# Patient Record
Sex: FEMALE | Race: ASIAN | NOT HISPANIC OR LATINO | Employment: FULL TIME | URBAN - METROPOLITAN AREA
[De-identification: names, ages, dates, MRNs, and addresses within clinical notes are randomized per-mention and may not be internally consistent; named-entity substitution may affect disease eponyms.]

---
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
## 2021-05-29 NOTE — PROGRESS NOTES
ASSESSMENT AND PLAN:  1. Screening examination for sexually transmitted disease    - Chlamydia trachomatis & Neisseria gonorrhoeae, Amplified Detection    2. Dermatitis    To develop the rash is present on her arms legs lower torso after swimming in the leg.  No one else was affected by the rash.  No evidence of involvement of mucosal surfaces.  Prednisone and hydroxyzine started for the rash her symptoms do not improve follow-up visit recommended            No orders of the defined types were placed in this encounter.    There are no discontinued medications.    No follow-ups on file.    CHIEF COMPLAINT:  Chief Complaint   Patient presents with     Rash     Check itchy rash on both legs,arms and abdomen since Friday (6/14/2019) after swimming       HISTORY OF PRESENT ILLNESS:  Gume is a 19 y.o. female who presents to the clinic today with her mother for rash. Gume is present with a Kimberli . This started four days ago right after [place]. It first started as spots on her ankle and wrist, and it then spread to her arms and legs. It is very itchy. There are some spots on her stomach and back, none in her groin area. The patient has been using an anti-itchy medication. A lot of other people went swimming in that same lake, but no one else got the same rash. She has not noticed any swollen glands in her neck or axillary areas.     REVIEW OF SYSTEMS:   Skin: Erythematous papular pruritic rash mostly on extremities.   Lymph nodes: No lymph node enlargement.  All other systems are negative.    PFSH:  Four days ago, she went swimming [place] a lake. She is accompanied by her mother today. Reviewed as below.     TOBACCO USE:  Social History     Tobacco Use   Smoking Status Never Smoker   Smokeless Tobacco Never Used       VITALS:  Vitals:    06/18/19 0858   BP: (!) 92/60   Patient Site: Left Arm   Patient Position: Sitting   Pulse: 79   Resp: 20   Temp: 98.1  F (36.7  C)   TempSrc: Oral   SpO2: 98%  "  Weight: 125 lb 4 oz (56.8 kg)   Height: 5' 1\" (1.549 m)     Wt Readings from Last 3 Encounters:   06/18/19 125 lb 4 oz (56.8 kg) (51 %, Z= 0.03)*   09/08/18 125 lb (56.7 kg) (54 %, Z= 0.11)*   03/14/18 125 lb (56.7 kg) (57 %, Z= 0.17)*     * Growth percentiles are based on [facility] (Girls, 2-20 Years) data.     Body mass index is 23.67 kg/m .      PHYSICAL EXAM:  General: Alert, cooperative, no distress, appears stated age  HEENT: Normocephalic, without obvious abnormality, atraumatic, moist mucous membranes  Eyes: PERRL, conjunctiva/cornea clear, EOM's intact  Lymph nodes: No cervical or axillary lymph node enlargement  Lungs: Clear to auscultation bilaterally, respirations unlabored  Heart: Regular rate and rhythm, S1 and S2 normal, no murmur, rub, or gallop  Neurologic:  A & O x 3.  No tremor, no focal findings.  Normal gait.   Skin: Non-blanching erythematous papular rash noted on the dorsal aspect of her hands, arms, and legs. A few papules on her abdomen. No petechiae. Rash not presents on palms, soles of feet, or fingernails.     DATA REVIEWED:  Additional History from Old Records Summarized (2): none  Decision to Obtain Records (1): none  Radiology Tests Summarized or Ordered (1): none  Labs Reviewed or Ordered (1): none  Medicine Test Summarized or Ordered (1): none  Independent Review of EKG or X-RAY(2 each): none      Francisca SALAZAR, am scribing for and in the presence of, Dr. Sanchez.    Dr. Daniel SALAZAR, personally performed the services described in this documentation, as scribed by Francisca Ayala in my presence, and it is both accurate and complete.      MEDICATIONS:  Current Outpatient Medications   Medication Sig Dispense Refill     famotidine (PEPCID) 40 MG tablet Take 1 tablet (40 mg total) by mouth at bedtime as needed for heartburn. 20 tablet 0     lidocaine-hydrocortisone-aloe 2.8-0.55 % Gel Apply twice a day 1 Tube 0     youwfvclh-anexazki-kpijq-w.pet 0.25-1 % Crea Apply thin layer to affected area twice " a day 1 Tube 0     No current facility-administered medications for this visit.      Total amount time spent in today's visit was 25 minutes greater than 50% of this time was spent discussing pathophysiology of dermatitis, and pharmacological actions of the medications today.  Total Data Points: 0    Please note that this clinical encounter uses voice recognition software, there may be typographical errors present

## 2021-05-29 NOTE — TELEPHONE ENCOUNTER
Pharmacist calling #[phone] stating that Hydroxyzine capsules are on back order so they are changing the script to tablets, please call with any questions.

## 2021-05-30 VITALS — BODY MASS INDEX: 25.89 KG/M2 | HEIGHT: 57 IN | WEIGHT: 120 LBS

## 2021-05-30 NOTE — TELEPHONE ENCOUNTER
New Appointment Needed  What is the reason for the visit:  pregnancy confirmation.    Pregnancy Confirmation Appt Needed  When was the first day of your last menstrual cycle?: patient said february is last time.   Have you done a home pregnancy test?: Yes: yes, positive .    Provider Preference: Any available  How soon do you need to be seen?: as soon as she can get in.   Waitlist offered?: No  Okay to leave a detailed message:  Yes

## 2021-05-31 VITALS — BODY MASS INDEX: 22.94 KG/M2 | WEIGHT: 121.5 LBS | HEIGHT: 61 IN

## 2021-05-31 VITALS — WEIGHT: 121.31 LBS | BODY MASS INDEX: 22.91 KG/M2 | HEIGHT: 61 IN

## 2021-05-31 NOTE — PROGRESS NOTES
CC: I was asked to see Gume King by Dr Blackburn secondary to a vulvar abscess.    HPI: The pt is a 19 y.o. SKarenF  who presents with a week history of a lump in the vulvar area.  She is seen with a professional Kimberli .  She is here with her mother.  It is increasing in size and becoming more painful.  She hasn't tried hot packs at home.     History reviewed. No pertinent past medical history.    History reviewed. No pertinent surgical history.    Patient's History reviewed. No pertinent family history.    Patient   Social History     Socioeconomic History     Marital status: Single     Spouse name: None     Number of children: None     Years of education: None     Highest education level: None   Occupational History     None   Social Needs     Financial resource strain: None     Food insecurity:     Worry: None     Inability: None     Transportation needs:     Medical: None     Non-medical: None   Tobacco Use     Smoking status: Never Smoker     Smokeless tobacco: Never Used   Substance and Sexual Activity     Alcohol use: Never     Frequency: Never     Drug use: Never     Sexual activity: Yes     Partners: Male   Lifestyle     Physical activity:     Days per week: None     Minutes per session: None     Stress: None   Relationships     Social connections:     Talks on phone: None     Gets together: None     Attends [employer] service: None     Active member of club or organization: None     Attends meetings of clubs or organizations: None     Relationship status: None     Intimate partner violence:     Fear of current or ex partner: None     Emotionally abused: None     Physically abused: None     Forced sexual activity: None   Other Topics Concern     None   Social History Narrative    9/8/18: Presents to the ED with mother and father.        Outpatient Medications Prior to Visit   Medication Sig Dispense Refill     acetaminophen (TYLENOL) 325 MG tablet Take 1-2 tablets (325-650 mg total) by mouth  every 4 (four) hours as needed.  0     docusate sodium (COLACE) 100 MG capsule Take 1 capsule (100 mg total) by mouth daily. (Patient not taking: Reported on 8/21/2019      )  0     ibuprofen (ADVIL,MOTRIN) 800 MG tablet Take 1 tablet (800 mg total) by mouth every 8 (eight) hours. (Patient not taking: Reported on 8/21/2019      )  0     No facility-administered medications prior to visit.        Patient has No Known Allergies.    ROS:  12 part ROS is negative aside from those symptoms in the HPI    PE:  /67 (Patient Site: Right Arm, Patient Position: Sitting, Cuff Size: Adult Regular)   Pulse 98   Ht 5' (1.524 m)   Wt 132 lb (59.9 kg)   LMP 06/07/2019           Body mass index is 25.78 kg/m .    General: overweight KarenF, NAD  EG/BUS: 0.5 x 1 cm cystic structure upper left labia majora; infiltrated with 1% lidocaine after cleaning with Betadine.  I&D with 11 blade scalpel.  Patient tolerated well.  Psych: normal mood  Neuro: CN I-XII grossly intact  MS: normal gait    Assessment: 19 y.o. SKare  with symptomatic Bartholin's    Plan: Natural history of vulvar cysts discussed with the patient.  I recommended she hot pack the area 2-3 times a day for the next couple days to keep it drained.  I counseled her that these can return.  If she gets a new one, she should try hot packing right away.  Questions answered.

## 2021-05-31 NOTE — PROGRESS NOTES
ASSESSMENT/PLAN:    Problem List Items Addressed This Visit     None      Visit Diagnoses     Abscess of left genital labia    -  Primary    Will have her get into OB/GYN today for potential draining of the abscess.    Relevant Orders    Ambulatory referral to Obstetrics / Gynecology    Dysuria        UTI ruled out with normal UA today    Relevant Orders    Urinalysis-UC if Indicated (Completed)           Return in about 1 month (around 9/21/2019) for Wellness Visit/Healthcare Maintainance Visit, with your primary care doctor.     SUBJECTIVE:  Gume King is a 19 y.o. female here for Vaginal bump and dysuria for about 1 week.  Worsening.    Of note, she had a stillbirth last month after presenting with spontaneous rupture of membranes at 22 weeks.  Lochia still present.      ROS:  Remainder review of systems is negative unless previously stated    PHQ-2 Total Score: 0 (6/18/2019  8:58 AM)       The following portions of the patient's history were reviewed and updated as appropriate: allergies, current medications and problem list  Current Outpatient Medications on File Prior to Visit   Medication Sig     acetaminophen (TYLENOL) 325 MG tablet Take 1-2 tablets (325-650 mg total) by mouth every 4 (four) hours as needed.     docusate sodium (COLACE) 100 MG capsule Take 1 capsule (100 mg total) by mouth daily. (Patient not taking: Reported on 8/21/2019      )     ibuprofen (ADVIL,MOTRIN) 800 MG tablet Take 1 tablet (800 mg total) by mouth every 8 (eight) hours. (Patient not taking: Reported on 8/21/2019      )     No current facility-administered medications on file prior to visit.         Social History     Tobacco Use   Smoking Status Never Smoker   Smokeless Tobacco Never Used       OBJECTIVE:  :  /72   Pulse 79   Temp 98.4  F (36.9  C) (Oral)   Resp 20   Ht 5' (1.524 m)   Wt 135 lb 8 oz (61.5 kg)   LMP 06/07/2019   SpO2 98%   BMI 26.46 kg/m    Wt Readings from Last 3 Encounters:   08/21/19 135 lb 8 oz  (61.5 kg) (68 %, Z= 0.47)*   07/09/19 130 lb (59 kg) (60 %, Z= 0.25)*   06/18/19 125 lb 4 oz (56.8 kg) (51 %, Z= 0.03)*     * Growth percentiles are based on [facility] (Girls, 2-20 Years) data.         Gen:  A&A, NAD  : Left labia majora is quite swollen.  There is no overlying erythema or induration.  The area is very tender.  There is a firm oblong mass that is probably 1/2 cm long by half centimeter wide.    Results for orders placed or performed in visit on 08/21/19   Urinalysis-UC if Indicated   Result Value Ref Range    Color, UA Yellow Colorless, Yellow, Straw, Light Yellow    Clarity, UA Slightly Cloudy (!) Clear    Glucose, UA Negative Negative    Bilirubin, UA Negative Negative    Ketones, UA Negative Negative    Specific Gravity, UA 1.020 1.005 - 1.030    Blood, UA Moderate (!) Negative    pH, UA 7.0 5.0 - 8.0    Protein, UA Negative Negative mg/dL    Urobilinogen, UA 0.2 E.U./dL 0.2 E.U./dL, 1.0 E.U./dL    Nitrite, UA Negative Negative    Leukocytes, UA Negative Negative

## 2021-05-31 NOTE — TELEPHONE ENCOUNTER
Vaginal pain / swelling x 1 week      Yes swollen labia area + pain with urination       Did miscarry last month - no follow up from from that   No fever   No belly pain   No vaginal bleeding     No blood in urine    No back pain    No fever          A/P:   > Appt for tomorrow for appt     > encourage fluids and ice pack prn pain / swelling prn      Juan Rebollar, RN   Triage and Medication Refills

## 2021-06-01 VITALS — BODY MASS INDEX: 23.6 KG/M2 | WEIGHT: 125 LBS | HEIGHT: 61 IN

## 2021-06-03 VITALS — BODY MASS INDEX: 23.65 KG/M2 | HEIGHT: 61 IN | WEIGHT: 125.25 LBS

## 2021-06-03 VITALS — BODY MASS INDEX: 25.91 KG/M2 | HEIGHT: 60 IN | WEIGHT: 132 LBS

## 2021-06-03 VITALS — BODY MASS INDEX: 26.6 KG/M2 | WEIGHT: 135.5 LBS | HEIGHT: 60 IN

## 2021-06-04 NOTE — TELEPHONE ENCOUNTER
report indicate that the patient needs WCC 18 yrs, HPV#3, tdap and flu vaccine   writer intends to contact the patient to assist in scheduling appointment. Called patient and agree to schedule appointment for January 31, 2020

## 2021-06-09 NOTE — PROGRESS NOTES
"ASSESMENT AND PLAN:  Diagnoses and all orders for this visit:    Constipation  -     docusate sodium (COLACE) 100 MG capsule; Take 1 capsule (100 mg total) by mouth 2 (two) times a day for 10 days.  Dispense: 10 capsule; Refill: 0    Hemorrhoid  -     phenylephrine-shark liver oil-mineral oil-petrolatum (PREPARATION H) rectal ointment;     Immunization due  -     HPV vaccine 9 valent 3 dose IM    Advised to increase fluids and fiber intake to avoid constipation.  We'll try Preparation H.  Advised to call if experience constant pain or increasing hemorrhage size and constant bleeding.          SUBJECTIVE: Gume King is here with rectal bleeding and pain.  She noticed a small bump also.  The bleeding is usually wipe type but sometimes mixed with the stool, fresh blood.  No constant rectal pain.  She tends to get constipated.  Mom states that she does not like to eat vegetables and fruits.  No recurrent abdominal pain.  No diarrhea in between constipations.    No past medical history on file.  Patient Active Problem List   Diagnosis     Constipation     Dermatitis     Abdominal Pain       Allergies:  No Known Allergies    History   Smoking Status     Never Smoker   Smokeless Tobacco     Never Used       Review of systems otherwise negative except as listed in HPI.   History   Smoking Status     Never Smoker   Smokeless Tobacco     Never Used       OBJECTICE: /66 (Patient Site: Left Arm, Patient Position: Sitting, Cuff Size: Adult Regular)  Pulse 86  Temp 98.2  F (36.8  C) (Oral)   Resp 16  Ht 4' 8.5\" (1.435 m)  Wt 120 lb (54.4 kg)  LMP  (LMP Unknown) Comment: irregular menses  BMI 26.43 kg/m2          GEN-alert,  in no apparent distress.  HEENT-mucous membranes are moist, neck is supple.  CV-regular rate and rhythm with no murmur.   RESP-lungs clear to auscultation .  RECTAL- small external hemorrhoid at the 10 o'clock position, no active bleeding, normal color.         Chong Larios   4/7/2017     "

## 2021-06-14 NOTE — PROGRESS NOTES
"ASSESMENT AND PLAN:  Diagnoses and all orders for this visit:    Hemorrhoids, unspecified hemorrhoid type  -     Ambulatory referral to Colorectal Surgery  Referral due to constant pain and bleeding.  We will try lidocaine hydrocortisone topical for now.    Immunization due  -     Influenza, Seasonal Quad, Preservative Free 36+ Months  -     Meningococcal MCV4P  -     HPV vaccine 9 valent 3 dose IM    Other orders  -     lidocaine-hydrocortisone-aloe 2.8-0.55 % Gel; Apply twice a day  Dispense: 1 Tube; Refill: 0        SUBJECTIVE: Gume King is here with rectal pain.  She was seen about 8 months ago with the same problem and was given hemorrhoid cream.  States the cream did not help.  She is having bleeding with every bowel movement but denied constipation.  Pain is  getting worse for the past few months.  She has trouble sitting through classes at school due to pain.  Denied pruritus.  She eats lots of vegetables and fruits and drink plenty of water.    No past medical history on file.  Patient Active Problem List   Diagnosis     Constipation     Dermatitis     Abdominal Pain       Allergies:  No Known Allergies    History   Smoking Status     Never Smoker   Smokeless Tobacco     Never Used       Review of systems otherwise negative except as listed in HPI.   History   Smoking Status     Never Smoker   Smokeless Tobacco     Never Used       OBJECTICE: BP 96/74 (Patient Site: Left Arm, Patient Position: Sitting, Cuff Size: Adult Regular)  Pulse 84  Temp 98.3  F (36.8  C) (Oral)   Resp 16  Ht 5' 0.63\" (1.54 m)  Wt 121 lb 8 oz (55.1 kg)  LMP 11/24/2017 (Exact Date)  BMI 23.24 kg/m2          GEN-alert,  in no apparent distress.  HEENT-mucous membranes are moist, neck is supple.  RECTAL- Small external hemorrhoids at 3 and 6 o'clock position. No bleeding on examining finger.  Exam was not completed due to pain      Chong Za   12/1/2017   This transcription uses voice recognition software, which may contain " typographical errors.

## 2021-06-15 NOTE — PROGRESS NOTES
[facility] Well Child Check    ASSESSMENT & PLAN  Gume King is a 17  y.o. 9  m.o. who has normal growth and normal development.    1. WCC (well child check)    2. Constipation  Increase fiber , fluids.     3. Pain with bowel movements  Referred to GI.  Upper GI and Colonoscopy done, no f/u appt scheduled yet.    4. Skin tag of perianal region  As above. Saw GI.    Return to clinic in 1 year for a Well Child Check or sooner as needed    IMMUNIZATIONS/LABS  No immunizations due today.    REFERRALS  Dental:  The patient has already established care with a dentist.  Other:  No additional referrals were made at this time.    ANTICIPATORY GUIDANCE  I have reviewed age appropriate anticipatory guidance.  Social:  Peer Pressure  Play and Communication:  Appropriate Use of TV and limit screentime  Health:  Dental Care  Sexuality:  Body Changes and Safe Sex    HEALTH HISTORY  Do you have any concerns that you'd like to discuss today?: No concerns       Accompanied by Mother    Refills needed? No    Do you have any forms that need to be filled out? Yes excuse note for school    services provided by: Agency     /Agency Name [employer] [facility]   Location of  Services: In person        Do you have any significant health concerns in your family history?: No  No family history on file.  Since your last visit, have there been any major changes in your family, such as a move, job change, separation, divorce, or death in the family?: No  Has a lack of transportation kept you from medical appointments?: No: pt has own transportation    Home  Who lives in your home?:  Mom, siblings and grandma  Social History     Social History Narrative     Do you have any concerns about losing your housing?: No  Is your housing safe and comfortable?: Yes  Do you have any trouble with sleep?:  No    Education  What school do you child attend?:  [employer]  What grade are you in?:   11th  How do you perform in school (grades, behavior, attention, homework?: Doing good, mother has no concerned.     Eating  Do you eat regular meals including fruits and vegetables?:  no  What are you drinking (cow's milk, water, soda, juice, sports drinks, energy drinks, etc)?: juice, water and milk  Have you been worried that you don't have enough food?: No  Do you have concerns about your body or appearance?:  No    Activities  Do you have friends?:  yes  Do you get at least one hour of physical activity per day?:  no  How many hours a day are you in front of a screen other than for schoolwork (computer, TV, phone)?:  4-5 hours  What do you do for exercise?: No  Do you have interest/participate in community activities/volunteers/school sports?:  no    MENTAL HEALTH SCREENING  PHQ-2 Total Score: 0 (1/4/2018 12:22 PM)  PHQ-9 Total Score: 0 (1/4/2018 12:22 PM)    VISION/HEARING  Vision: Completed. See Results  Hearing:  Completed. See Results     Hearing Screening    125Hz 250Hz 500Hz 1000Hz 2000Hz 3000Hz 4000Hz 6000Hz 8000Hz   Right ear:   Pass Pass Pass  Pass     Left ear:   Pass Pass Pass  Pass     Comments: 40 dBHL     Visual Acuity Screening    Right eye Left eye Both eyes   Without correction: 10/8 10/8 10/8   With correction:          TB Risk Assessment:  The patient and/or parent/guardian answer positive to:  No    Dyslipidemia Risk Screening  Have either of your parents or any of your grandparents had a stroke or heart attack before age 55?: No  Any parents with high cholesterol or currently taking medications to treat?: Yes: mother     Dental  When was the last time you saw the dentist?: Seen this past December 2017.   Is child seen by dentist?     Yes    Patient Active Problem List   Diagnosis     Constipation     Dermatitis     Abdominal Pain       Drugs  Does the patient use tobacco/alcohol/drugs?:  no    Safety  Does the patient have any safety concerns (peer or home)?:  no  Does the patient use  "safety belts, helmets and other safety equipment?:  yes    Sex  Have you ever had sex?:  No    MEASUREMENTS  Height:  5' 1\" (1.549 m)  Weight: 121 lb 5 oz (55 kg)  BMI: Body mass index is 22.92 kg/(m^2).  Blood Pressure: 98/74  Blood pressure percentiles are 14 % systolic and 79 % diastolic based on NHBPEP's 4th Report. Blood pressure percentile targets: 90: 123/79, 95: 127/83, 99 + 5 mmH/96.    PHYSICAL EXAM  Head - Normal.  Eyes-symmetric corneal pinpoint reflex, symmetric red reflex.  Extraocular movement intact.  ENT-tympanic membranes are clear bilaterally.  Oropharynx is clear.  Neck-supple, no palpable mass or lymphadenopathy.  CV-regular rate and rhythm with no murmur.  Respiratory-lungs clear to auscultation.  Abdomen-soft, nontender, no palpable masses or organomegaly.  Genitourinary- Deferrd, no concerns.  Extremities-warm with no edema.  Neurologic-cranial nerves II through XII are intact, strength and sensation are symmetric.  Skin-no atypical appearing lesions, no rash.    "

## 2021-06-16 PROBLEM — K64.4 SKIN TAG OF PERIANAL REGION: Status: ACTIVE | Noted: 2018-01-04

## 2021-06-16 PROBLEM — R19.8 PAIN WITH BOWEL MOVEMENTS: Status: ACTIVE | Noted: 2018-01-04

## 2021-06-16 NOTE — PROGRESS NOTES
"ASSESMENT AND PLAN:  Diagnoses and all orders for this visit:    Screening examination for pulmonary tuberculosis  -     QTF-Mycobacterium tuberculosis by [facility]-TB Gold  Will mail result if negative.  CXR if indicated.    Pain with bowel movements  Advised to keep f/u appt with GI.    Skin tag of perianal region        SUBJECTIVE: Gume King is here for screening TB test.   Has been working as PCA. Last TB test was 7 years ago, normal per mom. No record in chart.   No known active TB contact. No weight loss, night sweat or chronic cough.    Seen by GI for anal skin tag, work up done by GI, EGD, colonoscopy done. F/U appt scheduled next week.        No past medical history on file.  Patient Active Problem List   Diagnosis     Constipation     Dermatitis     Abdominal Pain     Pain with bowel movements     Skin tag of perianal region       Allergies:  No Known Allergies    History   Smoking Status     Never Smoker   Smokeless Tobacco     Never Used       Review of systems otherwise negative except as listed in HPI.   History   Smoking Status     Never Smoker   Smokeless Tobacco     Never Used       OBJECTICE: BP (!) 88/58 (Patient Site: Left Arm, Patient Position: Sitting, Cuff Size: Adult Regular)  Pulse 68  Temp 98  F (36.7  C) (Oral)   Resp 16  Ht 5' 1\" (1.549 m)  Wt 125 lb (56.7 kg)  LMP 03/05/2018 (Exact Date)  BMI 23.62 kg/m2          GEN-alert,  in no apparent distress.  HEENT-mucous membranes are moist, neck is supple.  CV-regular rate and rhythm with no murmur.   RESP-lungs clear to auscultation .  SKIN-normal        [facility]   3/14/2018   "

## 2021-06-19 NOTE — LETTER
Letter by Ave Stephenson MD at      Author: Ave Stephenson MD Service: -- Author Type: --    Filed:  Encounter Date: 8/22/2019 Status: (Other)         August 22, 2019     Patient: Gume King   YOB: 2000   Date of Visit: 8/22/2019       To Whom it May Concern:    Gume King was seen in my clinic on 8/22/2019.     If you have any questions or concerns, please don't hesitate to call.    Sincerely,         Electronically signed by Ave Stephenson MD

## 2021-06-19 NOTE — LETTER
Letter by Ree Sanford MD at      Author: Ree Sanford MD Service: -- Author Type: --    Filed:  Encounter Date: 7/9/2019 Status: (Other)         July 9, 2019     Patient: Gume King   YOB: 2000   Date of Visit: 7/9/2019       To Whom It May Concern:    Gume King was hospitalized at [facility] on 07/09/2019.  It is my medical opinion that Gume King should not return to work for 2 weeks.  She will be seen in clinic within 2 weeks to assess if she is able to return to work at that time..    If you have any questions or concerns, please don't hesitate to call.    Sincerely,        Electronically signed by Ree Sanford MD

## 2021-07-14 PROBLEM — Z34.90 PREGNANT: Status: RESOLVED | Noted: 2019-07-09 | Resolved: 2019-07-09

## 2022-04-19 ENCOUNTER — PATIENT OUTREACH (OUTPATIENT)
Dept: CARE COORDINATION | Facility: CLINIC | Age: 22
End: 2022-04-19

## 2022-04-19 NOTE — PROGRESS NOTES
Clinic Care Coordination Contact  [facility]/Voicemail    Clinical Data: Care Coordinator Outreach  Outreach attempted x 1. Unable to leave message on patient's voicemail with call back information and request return call. Cell phone voicemail is full.       Home phone [phone], belongs to someone else.     Plan: Care Coordinator will try to reach patient again in 1-2 business days.    Yolanda Cantrell  [facility] Care Coordination  [facility]    Phone: [phone]

## 2022-04-22 ENCOUNTER — PATIENT OUTREACH (OUTPATIENT)
Dept: CARE COORDINATION | Facility: CLINIC | Age: 22
End: 2022-04-22

## 2022-04-22 NOTE — LETTER
[facility] CARE COORDINATION  [address]  [place] [address]    April 22, 2022    Gume Knig  [address] [place] [address]      Dear Gume,    I am a clinic community health worker who works with Chong Larios MD at [facility]. I have been trying to reach you recently to introduce Clinic Care Coordination and to see if there was anything I could assist you with.  Below is a description of clinic care coordination and how I can further assist you.      The clinic care coordination team is made up of a registered nurse,  and community health worker who understand the health care system. The goal of clinic care coordination is to help you manage your health and improve access to the health care system in the most efficient manner. The team can assist you in meeting your health care goals by providing education, coordinating services, strengthening the communication among your providers and supporting you with any resource needs.    Please feel free to contact me at [phone] with any questions or concerns. We are focused on providing you with the highest-quality healthcare experience possible and that all starts with you.     Sincerely,     Yolanda Cantrell  Clinic Care Coordination  [facility]    Phone: [phone]

## 2022-04-22 NOTE — PROGRESS NOTES
Clinic Care Coordination Contact  [facility]/Voicemail    Clinical Data: Care Coordinator Outreach  Outreach attempted x 2. Unable to leave message on patient's voicemail with call back information and request return call. Cell phone voicemail is full.       Home phone [phone], belongs to someone else.     Plan: Care Coordinator will send care coordination introduction letter with care coordinator contact information and explanation of care coordination services via mail. Care Coordinator will do no further outreaches at this time.    Yolanda Cantrell  [facility] Care Coordination  [facility]    Phone: [phone]

## 2022-05-17 NOTE — PROGRESS NOTES
"Millbrook was seen today for contraception.    Diagnoses and all orders for this visit:    Nexplanon removal    Removed without difficulty, dressing placed.  Pt instructed to remove tomorrow and treat as normal ski.  RTC one year for routine preventive visit, sooner if she would like contraception. I did discuss with her that now that the Nexplanon is out, she has no protection against pregnancy.      Recommended chlamydia screening- she declined.  She is also due for a Pap, has never had one .  Rec. That she see her PCP for that, as she does not want to do it today.     Professional phone  used with this visit.    S:  Has had her Nexplanon in for almost three years and has had bleeding every day of those three years.  Sometimes light, sometimes heavy. Wants it out today, not interested in any other means of preventing pregnancy.     ROS neg  Past Medical, social, family histories, medications, and allergies reviewed and updated       O:  /60   Pulse 62   Resp 16   Ht 1.545 m (5' 0.83\")   Wt 68.4 kg (150 lb 12 oz)   SpO2 98%   BMI 28.64 kg/m      Patient is in no apparent physical distress.  Vitals are as recorded. She is masked.  Head and face are normal.  Conjunctiva are clear.  Extremities are without edema.  Gait is normal.  Skin is without rashes.  Mood and affect are appropriate.      Procedure Note:    Risks and benefits explained to patient, consent signed.  Area of  insertion scar prepped in sterile fashion.  3 cc 1% xylocaine used for local anesthesia. Skin punctured with 15 blade scalpel. Implant removed without  difficulty.  Pt tolerated procedure well. EBL 0, complications none.  Pressure dressing placed.            "

## 2022-05-23 ENCOUNTER — OFFICE VISIT (OUTPATIENT)
Dept: FAMILY MEDICINE | Facility: CLINIC | Age: 22
End: 2022-05-23

## 2022-05-23 VITALS
OXYGEN SATURATION: 98 % | HEART RATE: 62 BPM | RESPIRATION RATE: 16 BRPM | WEIGHT: 150.75 LBS | HEIGHT: 61 IN | SYSTOLIC BLOOD PRESSURE: 108 MMHG | BODY MASS INDEX: 28.46 KG/M2 | DIASTOLIC BLOOD PRESSURE: 60 MMHG

## 2022-05-23 DIAGNOSIS — Z30.46 NEXPLANON REMOVAL: Primary | ICD-10-CM

## 2022-05-23 PROCEDURE — 11982 REMOVE DRUG IMPLANT DEVICE: CPT | Mod: 25 | Performed by: FAMILY MEDICINE

## 2023-01-29 ENCOUNTER — HOSPITAL ENCOUNTER (EMERGENCY)
Facility: HOSPITAL | Age: 23
Discharge: HOME | End: 2023-01-29
Attending: EMERGENCY MEDICINE | Admitting: EMERGENCY MEDICINE
Payer: COMMERCIAL

## 2023-01-29 VITALS
SYSTOLIC BLOOD PRESSURE: 102 MMHG | OXYGEN SATURATION: 95 % | HEART RATE: 87 BPM | BODY MASS INDEX: 29.45 KG/M2 | WEIGHT: 150 LBS | HEIGHT: 60 IN | RESPIRATION RATE: 18 BRPM | DIASTOLIC BLOOD PRESSURE: 52 MMHG | TEMPERATURE: 98.4 F

## 2023-01-29 DIAGNOSIS — N75.0 BARTHOLIN CYST: ICD-10-CM

## 2023-01-29 PROCEDURE — 250N000009 HC RX 250: Performed by: EMERGENCY MEDICINE

## 2023-01-29 PROCEDURE — 250N000011 HC RX IP 250 OP 636: Performed by: EMERGENCY MEDICINE

## 2023-01-29 PROCEDURE — 56420 I&D BARTHOLINS GLAND ABSCESS: CPT

## 2023-01-29 PROCEDURE — 99283 EMERGENCY DEPT VISIT LOW MDM: CPT | Mod: 25

## 2023-01-29 RX ORDER — HYDROCODONE BITARTRATE AND ACETAMINOPHEN 5; 325 MG/1; MG/1
1 TABLET ORAL EVERY 6 HOURS PRN
Qty: 10 TABLET | Refills: 0 | Status: SHIPPED | OUTPATIENT
Start: 2023-01-29 | End: 2023-02-01

## 2023-01-29 RX ADMIN — EPINEPHRINE BITARTRATE 3 ML: 1 POWDER at 17:21

## 2023-01-29 ASSESSMENT — ACTIVITIES OF DAILY LIVING (ADL)
ADLS_ACUITY_SCORE: 35
ADLS_ACUITY_SCORE: 33

## 2023-01-29 NOTE — ED TRIAGE NOTES
Reports having Bartholin cyst first noted two months ago. Very painful, can't sit down. No fevers/chills. Took tylenol two hours ago.

## 2023-01-29 NOTE — ED PROVIDER NOTES
EMERGENCY DEPARTMENT ENCOUNTER            IMPRESSION:  Bartholin cyst      MEDICAL DECISION MAKING:  Patient evaluated for vaginal pain and swelling.  She has a history of Bartholin cyst.    On exam there is a large left Bartholin cyst.    The cyst was incised and drained without complication.    Patient discharged home with pain medication and outpatient GYN follow-up      =================================================================  CHIEF COMPLAINT:  Chief Complaint   Patient presents with     Cyst         HPI  Gume King is a 22 year old female with a history of skin tag of perianal region, pain with bowel movements, and dermatitis, who presents to the ED by walk in accompanied by boyfriend for evaluation of abscess.     For the past 2 months, the patient reports having an abscess to her rectum. She states that she initially did not have pain with the abscess. However, last Thursday (~2 weeks ago), the patient reports that she developed worsening pain to the abscess, prompting her to be present in the ED. She denies fevers and chills. She was able to take Tylenol ~2 hours ago. The patient also notes that she had an abscess to her rectum ~2 years ago. The patient states that they were able to drain the abscess and she felt improved. The patient otherwise denies any other symptoms or complaints at this time.    I, Wilner Aguilar am serving as a scribe to document services personally performed by Dr. Philip Reynolds MD, based on my observation and the provider's statements to me. I, Dr. Philip Reynolds MD attest that Wilner Aguilar is acting in a scribe capacity, has observed my performance of the services and has documented them in accordance with my direction.      REVIEW OF SYSTEMS   Constitutional: Denies fever, chills, unintentional weight loss or fatigue   Eyes: Denies visual changes or discharge    HENT: Denies sore throat, ear pain or neck pain  Respiratory: Denies cough or shortness of breath    Cardiovascular: Denies  chest pain, palpitations or leg swelling  GI: Denies abdominal pain, nausea, vomiting, or dark, bloody stools.    : Denies hematuria, dysuria, or flank pain  Musculoskeletal: Denies any new back pain or new muscle/joint pains  Skin: Positive for abscess. Denies rash or wound  Neurologic: Denies current headache, new weakness, focal weakness, or sensory changes    Lymphatic: Denies swollen glands    Psychiatric: Denies depression, suicidal ideation or homicidal ideation.    Remainder of systems reviewed, unless noted in HPI all others negative.      PAST MEDICAL HISTORY:  History reviewed. No pertinent past medical history.    PAST SURGICAL HISTORY:  History reviewed. No pertinent surgical history.      CURRENT MEDICATIONS:    HYDROcodone-acetaminophen (NORCO) 5-325 MG tablet        ALLERGIES:  No Known Allergies    FAMILY HISTORY:  History reviewed. No pertinent family history.    SOCIAL HISTORY:   Social History     Socioeconomic History     Marital status: Single     Spouse name: None     Number of children: None     Years of education: None     Highest education level: None   Tobacco Use     Smoking status: Never     Smokeless tobacco: Never   Substance and Sexual Activity     Alcohol use: Never     Drug use: Never     Sexual activity: Yes     Partners: Male   Social History Narrative    9/8/18: Presents to the ED with mother and father.        PHYSICAL EXAM:    /52   Pulse 87   Temp 98.4  F (36.9  C) (Oral)   Resp 18   Ht 1.524 m (5')   Wt 68 kg (150 lb)   LMP 01/14/2023 (Exact Date)   SpO2 95%   BMI 29.29 kg/m      Constitutional: Awake, alert, in no acute distress  Respiratory: Respirations even, unlabored.  Cardiovascular: Regular   GI: Large 3 cm left labial Bartholin's gland cyst.  Nurse chaperone  Back: No CVA tenderness.    Psychiatric: Normal mood and affect. Normal judgement.    ED COURSE:    5:02 PM I met with the patient, obtained history, performed an initial exam, and discussed  options and plan for diagnostics and treatment here in the ED. PPE worn: surgical mask, gloves   7:39 PM I performed an s/p incision and drainage.  8:20 PM I rechecked and updated the patient. Patient is comfortable and agreeable with plan to discharge.      Medical Decision Making    History:    Supplemental history from: Family    External Record(s) reviewed: External medical records care everywhere    Work Up:    Chart documentation includes differential considered and any EKGs or imaging independently interpreted by provider.  Laboratory, EKG, radiology independently reviewed    In additional to work up documented, I considered the following work up:    External consultation:    Discussion of management with another provider: None     Complicating factors:    Care impacted by chronic illness: None    Care affected by social determinants of health: Access to care    Disposition considerations: Discharge            I have independently reviewed and interpreted the EKG(s) documented above.      PROCEDURES:   PROCEDURE: Incision and Drainage   INDICATIONS: Localized abscess   PROCEDURE PROVIDER: Dr Philip Reynolds   SITE:  Left labia   MEDICATION:  10 mLs of 1% Lidocaine with epinephrine   NOTE: The area was prepped with chlorhexidine and draped off in the usual sterile fashion.  Local anesthetic was injected subcutaneously with anesthesia effects demonstrated prior to proceeding.  The area of maximal fluctuance was opened with a # 11 Blade ([place]) using a Single Straight incision to allow for drainage.  The abscess was drained.  The abscess cavity was bluntly explored to separate any loculations. No Packing was placed into the abscess cavity.  A sterile dressing was placed over the area.   COMPLEXITY: Complex  Special attention to avoid the urethra.  Simple = single, furuncle, paronychia, superficial  Complex = multiple or abscess requiring probing, loculations, packing placement   COMPLICATIONS: Patient tolerated  procedure well, without complication           MEDICATIONS GIVEN IN THE EMERGENCY:  Medications   lidocaine/EPINEPHrine/tetracaine (LET) solution KIT 3 mL (3 mLs Topical Given 1/29/23 0941)           NEW PRESCRIPTIONS STARTED AT TODAY'S ER VISIT:  Discharge Medication List as of 1/29/2023  8:43 PM      START taking these medications    Details   HYDROcodone-acetaminophen (NORCO) 5-325 MG tablet Take 1 tablet by mouth every 6 hours as needed for severe pain (7-10), Disp-10 tablet, R-0, Local Print                Prior to making a final disposition on this patient the results of patient's tests and other diagnostic studies were discussed with the patient. All questions were answered. Patient expressed understanding of the plan and was amenable to it.      FINAL DIAGNOSIS:    ICD-10-CM    1. Bartholin cyst  N75.0                  NAME: Gume King  AGE: 22 year old female  YOB: 2000  MRN: [id number]  EVALUATION DATE & TIME: 1/29/2023  4:52 PM    PCP: Chong Larios    ED PROVIDER: Philip Reynolds M.D.      I, Wilner Aguilar, am serving as a scribe to document services personally performed by Dr. Philip Reynolds based on my observation and the provider's statements to me. IPhilip MD attest that Wilner Aguilar is acting in a scribe capacity, has observed my performance of the services and has documented them in accordance with my direction.    Philip Reynolds M.D.  Emergency Medicine  [facility] EMERGENCY DEPARTMENT  [facility] [address]  [phone]  Dept: [phone]  1/29/2023       Philip Reynolds MD  01/29/23 1497

## 2023-01-30 NOTE — DISCHARGE INSTRUCTIONS
A cyst was drained of fluid.  He will need follow-up within 1 week.  A drain may need to be placed.  Pain medication prescribed

## 2023-05-14 ENCOUNTER — HEALTH MAINTENANCE LETTER (OUTPATIENT)
Age: 23
End: 2023-05-14

## 2023-05-23 ENCOUNTER — HOSPITAL ENCOUNTER (EMERGENCY)
Facility: HOSPITAL | Age: 23
Discharge: HOME | End: 2023-05-23
Admitting: STUDENT IN AN ORGANIZED HEALTH CARE EDUCATION/TRAINING PROGRAM
Payer: COMMERCIAL

## 2023-05-23 VITALS
WEIGHT: 154 LBS | RESPIRATION RATE: 15 BRPM | DIASTOLIC BLOOD PRESSURE: 65 MMHG | BODY MASS INDEX: 30.08 KG/M2 | TEMPERATURE: 98 F | OXYGEN SATURATION: 98 % | SYSTOLIC BLOOD PRESSURE: 106 MMHG | HEART RATE: 85 BPM

## 2023-05-23 DIAGNOSIS — N75.0 BARTHOLIN CYST: ICD-10-CM

## 2023-05-23 PROCEDURE — 99284 EMERGENCY DEPT VISIT MOD MDM: CPT

## 2023-05-23 PROCEDURE — 250N000013 HC RX MED GY IP 250 OP 250 PS 637

## 2023-05-23 RX ORDER — SULFAMETHOXAZOLE/TRIMETHOPRIM 800-160 MG
1 TABLET ORAL 2 TIMES DAILY
Qty: 20 TABLET | Refills: 0 | Status: SHIPPED | OUTPATIENT
Start: 2023-05-23 | End: 2023-06-02

## 2023-05-23 RX ORDER — SULFAMETHOXAZOLE/TRIMETHOPRIM 800-160 MG
1 TABLET ORAL ONCE
Status: COMPLETED | OUTPATIENT
Start: 2023-05-23 | End: 2023-05-23

## 2023-05-23 RX ADMIN — SULFAMETHOXAZOLE AND TRIMETHOPRIM 1 TABLET: 800; 160 TABLET ORAL at 16:57

## 2023-05-23 ASSESSMENT — ENCOUNTER SYMPTOMS
CHILLS: 0
ABDOMINAL PAIN: 0
VOMITING: 0
DIARRHEA: 0
NAUSEA: 0
FEVER: 0
SHORTNESS OF BREATH: 0

## 2023-05-23 NOTE — ED PROVIDER NOTES
EMERGENCY DEPARTMENT ENCOUNTER      NAME: Gume King  AGE: 23 year old adult  YOB: 2000  MRN: [id number]  EVALUATION DATE & TIME: 5/23/2023  3:50 PM    PCP: Chong Larios    ED PROVIDER: Cele Patino PA-C      Chief Complaint   Patient presents with     Cyst     FINAL IMPRESSION:  1. Bartholin cyst          ED COURSE & MEDICAL DECISION MAKING:    Pertinent Labs & Imaging studies reviewed. (See chart for details)  23 year old adult presents to the Emergency Department for evaluation of cyst.  Per chart review, on 1/29/2023 patient was seen in the ED and diagnosed with a bartholin cyst.  Yesterday patient started to notice a dime sized cyst developing on her left labia.  She has not taken ibuprofen or Tylenol.  No baths.  Vital signs reviewed and patient is slightly hypertensive most likely due to pain.  Afebrile.  On exam dime size area on the left labia that is soft and mildly tender. No fluctuance. No abscess. No overlying erythema, edema, ecchymosis.  No warmth.  No lacerations or abrasions.  No drainage.  Exam is otherwise unremarkable.    Unfortunately there is no area that could be drained. Patient was given a dose of Bactrim here in the ED.  Patient declined pain medication.  Patient was discharged home.  Cyst care was discussed with the patient.  Patient will soak in warm baths 3 times a day.  Patient was also prescribed Bactrim for home.  Patient will follow-up with her OB/GYN and her primary care doctor to discuss her ED visit.  Patient return to the ED if new symptoms develop or symptoms worsen.  Patient agrees with plan.  All questions answered.      ED COURSE:   3:56 PM  I saw the patient.   4:38 PM I updated the patient on the ED visit.  Patient agrees with plan.  All questions answered.       At the conclusion of the encounter I discussed the results of all of the tests and the disposition. The questions were answered. The patient or family acknowledged understanding and was agreeable with  the care plan.  On 1/29/2023 patient was seen in the ED for a Bartholin cyst.  Patient's cyst was drained and was told to follow-up with GYN.    0 minutes of critical care time       Additional Documentation    History:    Supplemental history from: Documented in chart, if applicable    External Record(s) reviewed: Documented in chart, if applicable.    Work Up:    Chart documentation includes differential considered and any EKGs or imaging interpreted by provider.    In additional to work up documented, I considered the following work up: Documented in chart, if applicable.    External consultation:    Discussion of management with another provider: Documented in chart, if applicable    Complicating factors:    Care impacted by chronic illness: N/A    Care affected by social determinants of health: Access to Medical Care    Disposition considerations: Discharge. I prescribed additional prescription strength medication(s) as charted. See documentation for any additional details.      MEDICATIONS GIVEN IN THE EMERGENCY:  Medications   sulfamethoxazole-trimethoprim (BACTRIM DS) 800-160 MG per tablet 1 tablet (1 tablet Oral $Given 5/23/23 1520)       NEW PRESCRIPTIONS STARTED AT TODAY'S ER VISIT  Discharge Medication List as of 5/23/2023  5:00 PM      START taking these medications    Details   sulfamethoxazole-trimethoprim (BACTRIM DS) 800-160 MG tablet Take 1 tablet by mouth 2 times daily for 10 days, Disp-20 tablet, R-0, Local Print            =================================================================    HPI    Patient information was obtained from: patient    Use of : N/A        Gume King is a 23 year old adult with a pertinent history of bartholin cyst, dermatitis who presents to this ED for evaluation of cyst.    Per chart review, patient was seen on 1/29/23 for a bartholin cyst. Patient cyst was drained and told to follow up with GYN.  Yesterday patient started to notice a dime size cyst  developing on her left labia.  She reports it is in the same area as her past cysts.  She currently rates her pain as a 7 out of 10.  The cyst is not draining yet.  She has not taken ibuprofen or Tylenol.  She has not soaked in the bathtub either.  She reports she has not followed up with GYN.    She denies fevers, chills, chest pain, shortness of breath, nausea, vomiting, diarrhea, urinary symptoms, vaginal discharge, vaginal bleeding.    REVIEW OF SYSTEMS   Review of Systems   Constitutional: Negative for chills and fever.   Respiratory: Negative for shortness of breath.    Cardiovascular: Negative for chest pain.   Gastrointestinal: Negative for abdominal pain, diarrhea, nausea and vomiting.   Genitourinary: Negative.    Skin:        Dime size cyst on left labia.    All other systems reviewed and are negative.      PAST MEDICAL HISTORY:  No past medical history on file.    PAST SURGICAL HISTORY:  No past surgical history on file.        CURRENT MEDICATIONS:    sulfamethoxazole-trimethoprim (BACTRIM DS) 800-160 MG tablet        ALLERGIES:  No Known Allergies    FAMILY HISTORY:  No family history on file.    SOCIAL HISTORY:   Social History     Socioeconomic History     Marital status: Single   Tobacco Use     Smoking status: Never     Smokeless tobacco: Never   Substance and Sexual Activity     Alcohol use: Never     Drug use: Never     Sexual activity: Yes     Partners: Male   Social History Narrative    9/8/18: Presents to the ED with mother and father.        VITALS:  /65   Pulse 85   Temp 98  F (36.7  C) (Temporal)   Resp 15   Wt 69.9 kg (154 lb)   SpO2 98%   BMI 30.08 kg/m      PHYSICAL EXAM    Physical Exam  Vitals and nursing note reviewed. Exam conducted with a chaperone present.   Constitutional:       Appearance: Normal appearance.   HENT:      Head: Atraumatic.      Right Ear: External ear normal.      Left Ear: External ear normal.      Nose: Nose normal.      Mouth/Throat:      Mouth:  Mucous membranes are moist.   Eyes:      Conjunctiva/sclera: Conjunctivae normal.      Pupils: Pupils are equal, round, and reactive to light.   Cardiovascular:      Rate and Rhythm: Normal rate and regular rhythm.      Pulses: Normal pulses.      Heart sounds: Normal heart sounds. No murmur heard.     No friction rub. No gallop.   Pulmonary:      Effort: Pulmonary effort is normal.      Breath sounds: Normal breath sounds. No wheezing or rales.   Abdominal:      Tenderness: There is no abdominal tenderness. There is no guarding or rebound.   Genitourinary:     Pubic Area: No rash.       Labia:         Right: No rash, tenderness, lesion or injury.         Left: Tenderness present. No rash, lesion or injury.       Urethra: No prolapse, urethral pain, urethral swelling or urethral lesion.      Comments: Dime size area that is soft but mildly tender. No area of fluctuance. No overlying erythema, edema, ecchymosis.  No lacerations or abrasions.  No warmth.  Musculoskeletal:      Cervical back: Normal range of motion.   Skin:     General: Skin is dry.   Neurological:      Mental Status: PRUE Moo is alert. Mental status is at baseline.   Psychiatric:         Mood and Affect: Mood normal.         Thought Content: Thought content normal.       Cele Patino PA-C  [facility] EMERGENCY DEPARTMENT  [facility] [facility] [address] [phone]     Cele Patino PA-C  05/23/23 0674

## 2023-05-23 NOTE — ED TRIAGE NOTES
patient reports she has reoccurrence of bartholin cyst and it is painful here to have it checked out.

## 2023-05-23 NOTE — DISCHARGE INSTRUCTIONS
Unfortunately your Bartholin cyst is not ready to be drained.  Take Bactrim as prescribed. Apply warm compresses to the area 3 times a day.  With your primary care doctor to discuss your ED visit.  Return to the ED if new symptoms develop or symptoms worsen.

## 2023-05-26 ENCOUNTER — OFFICE VISIT (OUTPATIENT)
Dept: FAMILY MEDICINE | Facility: CLINIC | Age: 23
End: 2023-05-26
Payer: COMMERCIAL

## 2023-05-26 VITALS
DIASTOLIC BLOOD PRESSURE: 60 MMHG | BODY MASS INDEX: 30.09 KG/M2 | SYSTOLIC BLOOD PRESSURE: 106 MMHG | HEART RATE: 87 BPM | RESPIRATION RATE: 16 BRPM | HEIGHT: 60 IN | OXYGEN SATURATION: 98 % | WEIGHT: 153.25 LBS | TEMPERATURE: 97.5 F

## 2023-05-26 DIAGNOSIS — N75.8 BARTHOLIN'S GLAND INFECTION: Primary | ICD-10-CM

## 2023-05-26 PROCEDURE — 99213 OFFICE O/P EST LOW 20 MIN: CPT | Performed by: FAMILY MEDICINE

## 2023-05-26 RX ORDER — DOXYCYCLINE HYCLATE 100 MG
100 TABLET ORAL 2 TIMES DAILY
Qty: 20 TABLET | Refills: 0 | Status: SHIPPED | OUTPATIENT
Start: 2023-05-26 | End: 2023-06-05

## 2023-05-26 NOTE — PROGRESS NOTES
Assessment & Plan     Bartholin's gland infection    Persistent.  Stop Septra, because it is not helping.  Begin doxycyline.  Continue hot soaks.  Recheck if any problem.    - doxycycline hyclate (VIBRA-TABS) 100 MG tablet  Dispense: 20 tablet; Refill: 0  - lidocaine 1 % 0.5 mL             MED REC REQUIRED  Post Medication Reconciliation Status: patient was not discharged from an inpatient facility or TCU  BMI:   Estimated body mass index is 29.93 kg/m  as calculated from the following:    Height as of this encounter: 1.524 m (5').    Weight as of this encounter: 69.5 kg (153 lb 4 oz).           Agustín Nieves MD  [facility] JASON REED is a 23 year old, presenting for the following health issues:  Hospital F/U (admitted 5/23/2023 - Bartholin Cyst )        5/26/2023     1:49 PM   Additional Questions   Roomed by Valencia Sainz MA   Accompanied by Mom     HPI     She was seen at [facility] ER 5/23/23 for left bartholin's gland infection.  There was nothing to drain.  She was started on Septra.    She states that symptoms have been worsening.  Doing hot soaks 3 times daily.  The area is more swollen and painful.  She would like to have it drained, like it was in January this year.    Current Outpatient Medications   Medication Sig Dispense Refill     doxycycline hyclate (VIBRA-TABS) 100 MG tablet Take 1 tablet (100 mg) by mouth 2 times daily for 10 days 20 tablet 0           Review of Systems   No cough.  No dysuria.      Objective    LMP 05/19/2023 (Exact Date)   There is no height or weight on file to calculate BMI.  Physical Exam   Heart normal  Lungs normal  Genitalia: 2 cm x 1 cm x 1 cm swelling in area of left Bartholin's gland.  Very tender.  No inguinal adenopathy    See procedure note re: I&D

## 2023-06-03 NOTE — PROCEDURES
Ave was present as chaperone and assistant.  Consent signed.  Lithotomy position.  I prepped the left anterior labia area with betadine swabsticks.  I infiltrated 0.5 ml lidocaine without.  I made a straight incision into the center of the fluctuant area.  Only a small amount of blood was obtained.  Patient tolerated the procedure without complication.  bernard

## 2023-09-18 ENCOUNTER — TELEPHONE (OUTPATIENT)
Dept: FAMILY MEDICINE | Facility: CLINIC | Age: 23
End: 2023-09-18
Payer: COMMERCIAL

## 2023-10-03 ENCOUNTER — TELEPHONE (OUTPATIENT)
Dept: FAMILY MEDICINE | Facility: CLINIC | Age: 23
End: 2023-10-03
Payer: COMMERCIAL

## 2023-10-03 NOTE — TELEPHONE ENCOUNTER
Forms/Letter Request    Type of form/letter: FMLA - Unknown    Have you been seen for this request: No-Has appointment with Dr. Larios on 10/12    Do we have the form/letter: Yes: Placed in Dr. Larios's blue folder.    Who is the form from? Insurance comp[employer]    Where did/will the form come from? form was faxed in    When is form/letter needed by: Form does not specify.    How would you like the form/letter returned: Fax : [phone]    Patient Notified form requests are processed in 3-5 business days:No    Could we send this information to you in [place] or would you prefer to receive a phone call?:   Form was faxed in.

## 2023-10-12 ENCOUNTER — OFFICE VISIT (OUTPATIENT)
Dept: FAMILY MEDICINE | Facility: CLINIC | Age: 23
End: 2023-10-12
Payer: COMMERCIAL

## 2023-10-12 VITALS
DIASTOLIC BLOOD PRESSURE: 64 MMHG | HEART RATE: 76 BPM | SYSTOLIC BLOOD PRESSURE: 100 MMHG | TEMPERATURE: 98.8 F | BODY MASS INDEX: 27.9 KG/M2 | OXYGEN SATURATION: 98 % | WEIGHT: 147.8 LBS | RESPIRATION RATE: 20 BRPM | HEIGHT: 61 IN

## 2023-10-12 DIAGNOSIS — Z12.4 CERVICAL CANCER SCREENING: ICD-10-CM

## 2023-10-12 DIAGNOSIS — N75.8 BARTHOLIN'S GLAND INFECTION: Primary | ICD-10-CM

## 2023-10-12 DIAGNOSIS — Z23 IMMUNIZATION DUE: ICD-10-CM

## 2023-10-12 DIAGNOSIS — Z11.3 SCREENING FOR STDS (SEXUALLY TRANSMITTED DISEASES): ICD-10-CM

## 2023-10-12 PROCEDURE — 90686 IIV4 VACC NO PRSV 0.5 ML IM: CPT | Performed by: FAMILY MEDICINE

## 2023-10-12 PROCEDURE — 87591 N.GONORRHOEAE DNA AMP PROB: CPT | Performed by: FAMILY MEDICINE

## 2023-10-12 PROCEDURE — 91320 SARSCV2 VAC 30MCG TRS-SUC IM: CPT | Performed by: FAMILY MEDICINE

## 2023-10-12 PROCEDURE — 87491 CHLMYD TRACH DNA AMP PROBE: CPT | Performed by: FAMILY MEDICINE

## 2023-10-12 PROCEDURE — 90480 ADMN SARSCOV2 VAC 1/ONLY CMP: CPT | Performed by: FAMILY MEDICINE

## 2023-10-12 PROCEDURE — 99214 OFFICE O/P EST MOD 30 MIN: CPT | Mod: 25 | Performed by: FAMILY MEDICINE

## 2023-10-12 PROCEDURE — 90471 IMMUNIZATION ADMIN: CPT | Performed by: FAMILY MEDICINE

## 2023-10-12 PROCEDURE — G0145 SCR C/V CYTO,THINLAYER,RESCR: HCPCS | Performed by: FAMILY MEDICINE

## 2023-10-12 NOTE — PROGRESS NOTES
"  Bartholin's gland infection  Recurrent.  FMLA form filled out and signed, see scanned document.  - Ob/Gyn  Referral; Future    Screening for STDs (sexually transmitted diseases)  - NEISSERIA GONORRHOEA PCR; Future  - CHLAMYDIA TRACHOMATIS PCR  - NEISSERIA GONORRHOEA PCR    Cervical cancer screening  - Pap Screen only - recommended age 21 - 24 years    Immunization due  - INFLUENZA VACCINE IM > 6 MONTHS VALENT IIV4 (AFLURIA/FLUZONE)  - COVID-19 12+ (2023-24) (PFIZER)     Lul Dunaway is a 23 year old, presenting for the following health issues:  She is here for pap smear and gynecology referral due to recurrent  Bartholin cyst infection.  She also needs FMLA due to this. She states she had a total of 4  episodes of Bartholin cyst infection this year, first episode was in 1/23.  She missed work at least 3 days's time due to severe pain during these episodes.  She was last seen at urgent care 9 days ago, I&D was done..  She was recommended to see GYN due to recurrent Bartholin cyst infection.    She is also requesting Pap smear today.  She is sexually active.  Not using any birth control methods.  Does not want to be on birth control.  No abnormal vaginal discharge.        10/12/2023     2:30 PM   Additional Questions   Roomed by VERONICA DONNELLY   Accompanied by Self     Review of Systems   CONSTITUTIONAL: NEGATIVE for fever, chills, change in weight  ENT/MOUTH: NEGATIVE for ear, mouth and throat problems  RESP: NEGATIVE for significant cough or SOB      Objective    /64 (BP Location: Left arm, Patient Position: Sitting, Cuff Size: Adult Regular)   Pulse 76   Temp 98.8  F (37.1  C) (Oral)   Resp 20   Ht 1.555 m (5' 1.22\")   Wt 67 kg (147 lb 12.8 oz)   LMP 09/23/2023 (Exact Date)   SpO2 98%   BMI 27.73 kg/m    Body mass index is 27.73 kg/m .  Physical Exam   GENERAL: healthy, alert and no distress  NECK: Supple.  ABDOMEN: soft, nontender, no hepatosplenomegaly, no masses and bowel sounds " normal\  RESP- Lungs are clear  CVS- regular rate and rhythm no murmur.   (female): normal female external genitalia, normal urethral meatus, vaginal mucosa, normal cervix/adnexa/uterus without masses or discharge  MS: no gross musculoskeletal defects noted, no edema  PSYCH: mentation appears normal, affect normal/bright    This transcription uses voice recognition software, which may contain typographical errors.                  Answers submitted by the patient for this visit:  General Questionnaire (Submitted on 10/12/2023)  Chief Complaint: Chronic problems general questions HPI Form  What is the reason for your visit today? : Bartholin cyst.  How many servings of fruits and vegetables do you eat daily?: 2-3  On average, how many sweetened beverages do you drink each day (Examples: soda, juice, sweet tea, etc.  Do NOT count diet or artificially sweetened beverages)?: 1  How many minutes a day do you exercise enough to make your heart beat faster?: 30 to 60  How many days a week do you exercise enough to make your heart beat faster?: 4  How many days per week do you miss taking your medication?: 0

## 2023-10-13 ENCOUNTER — TELEPHONE (OUTPATIENT)
Dept: OBGYN | Facility: CLINIC | Age: 23
End: 2023-10-13
Payer: COMMERCIAL

## 2023-10-13 LAB
C TRACH DNA SPEC QL NAA+PROBE: NEGATIVE
N GONORRHOEA DNA SPEC QL NAA+PROBE: NEGATIVE

## 2023-10-13 NOTE — TELEPHONE ENCOUNTER
This encounter is being sent to inform the clinic that this patient has a referral from Chong Larios MD for the diagnoses of Bartholin's gland infection.  Based on the availability of our provider(s), we are unable to accommodate this request.    Were all sites offered this patient?  Yes    Does scheduling algorithm request to schedule next available?  Patient has been scheduled for the first available opening with Dr. Eddy on 12/7/23.  We have informed the patient that the clinic will review their referral and reach out if a sooner appointment is medically necessary.       Per protocols patient is being referred and was not scheduled within 14 days. Please review and call Pt to schedule if a sooner appointment is needed

## 2023-10-13 NOTE — TELEPHONE ENCOUNTER
LVM for patient with  to call back.  Would like to move appointment to next week with Jazmyn.    Alice Ramsey RN

## 2023-10-16 LAB
BKR LAB AP GYN ADEQUACY: NORMAL
BKR LAB AP GYN INTERPRETATION: NORMAL
BKR LAB AP HPV REFLEX: NORMAL
BKR LAB AP LMP: NORMAL
BKR LAB AP PREVIOUS ABNORMAL: NORMAL
PATH REPORT.COMMENTS IMP SPEC: NORMAL
PATH REPORT.COMMENTS IMP SPEC: NORMAL
PATH REPORT.RELEVANT HX SPEC: NORMAL

## 2023-10-16 NOTE — TELEPHONE ENCOUNTER
Patient was seen for Bartholin Cyst 10/12/23 and PCP recommended follow up/consult with OBGYN for recurrence.    Patient had I&D completed on 10/12 and does not have any immediate concerns today.  Will keep appt as scheduled for 12/7/23.    Alice Ramsey RN

## 2023-10-16 NOTE — TELEPHONE ENCOUNTER
Form reviewed, completed, and signed by MD.     Faxed to [facility] as requested by patient.     Copied to EHR scanning.

## 2023-12-29 ENCOUNTER — OFFICE VISIT (OUTPATIENT)
Dept: FAMILY MEDICINE | Facility: CLINIC | Age: 23
End: 2023-12-29
Payer: COMMERCIAL

## 2023-12-29 VITALS
TEMPERATURE: 98.7 F | SYSTOLIC BLOOD PRESSURE: 111 MMHG | RESPIRATION RATE: 18 BRPM | WEIGHT: 137.4 LBS | HEART RATE: 101 BPM | OXYGEN SATURATION: 97 % | DIASTOLIC BLOOD PRESSURE: 63 MMHG | BODY MASS INDEX: 25.78 KG/M2

## 2023-12-29 DIAGNOSIS — N75.8 BARTHOLIN'S GLAND INFECTION: Primary | ICD-10-CM

## 2023-12-29 PROCEDURE — 56420 I&D BARTHOLINS GLAND ABSCESS: CPT | Performed by: FAMILY MEDICINE

## 2023-12-29 PROCEDURE — 99213 OFFICE O/P EST LOW 20 MIN: CPT | Mod: 25 | Performed by: FAMILY MEDICINE

## 2023-12-29 RX ORDER — CEPHALEXIN 500 MG/1
500 CAPSULE ORAL 3 TIMES DAILY
Qty: 21 CAPSULE | Refills: 0 | Status: SHIPPED | OUTPATIENT
Start: 2023-12-29 | End: 2024-01-05

## 2023-12-29 NOTE — PROGRESS NOTES
ASSESSMENT:  Bartholin gland cyst infection    PLAN:  After informed consent was obtained, using Betadine for cleansing   and 1% Lidocaine  with epinephrine for anesthetic, with sterile   technique, an incision was made into the Bartholin gland abscess cavity which was   then drained of purulent material.  Procedure well   tolerated.  Dressing applied and wound care instructions provided.   Recommend frequent warm tub soaks   until abscess resolves. Return to clinic prn for pain, increased   swelling or fever.  Patient started on cephalexin to cover infection.  Keep appointment with gynecology in January for definitive treatment of the Bartholin gland cyst which has been recurrent    SUBJECTIVE:  23 year old adult presents with recurrent Bartholin gland cyst infections (5 times over the past year) comes in today with 1 month history of enlarging Bartholin gland cyst and 4-day history of worsening pain.  No fever or chills.  No history of diabetes.  He has an appointment at the end of January with gynecology for definitive treatment of this.    OBJECTIVE:  Exquisitely tender fluctuant abscess noted on the left vaginal introitus measuring approximately 3 cm in diameter .  there is mild  surrounding induration, erythema and tenderness. Afebrile.

## 2023-12-29 NOTE — LETTER
December 29, 2023      Gume King  [address] [place] [address]        To Whom It May Concern:    Gume King  was seen on 12/29/2023.  Please excuse her boyfriend Lyle Quan from work 12/29/2023 to take her to the doctor.        Sincerely,        Nasreen Ramírez MD

## 2024-01-19 ENCOUNTER — OFFICE VISIT (OUTPATIENT)
Dept: OBGYN | Facility: CLINIC | Age: 24
End: 2024-01-19
Attending: FAMILY MEDICINE

## 2024-01-19 VITALS
BODY MASS INDEX: 26.45 KG/M2 | SYSTOLIC BLOOD PRESSURE: 113 MMHG | TEMPERATURE: 97.6 F | WEIGHT: 141 LBS | DIASTOLIC BLOOD PRESSURE: 74 MMHG | HEART RATE: 79 BPM

## 2024-01-19 DIAGNOSIS — N75.8 BARTHOLIN'S GLAND INFECTION: Primary | ICD-10-CM

## 2024-01-19 DIAGNOSIS — Z11.3 ROUTINE SCREENING FOR STI (SEXUALLY TRANSMITTED INFECTION): ICD-10-CM

## 2024-01-19 DIAGNOSIS — Z34.90 EARLY STAGE OF PREGNANCY: ICD-10-CM

## 2024-01-19 DIAGNOSIS — O09.299 PRIOR PREGNANCY WITH FETAL DEMISE: Primary | ICD-10-CM

## 2024-01-19 DIAGNOSIS — Z87.59 HISTORY OF PRETERM PREMATURE RUPTURE OF MEMBRANES (PPROM): ICD-10-CM

## 2024-01-19 DIAGNOSIS — O09.291 PRIOR PREGNANCY WITH FETAL DEMISE AND CURRENT PREGNANCY IN FIRST TRIMESTER: ICD-10-CM

## 2024-01-19 LAB
C TRACH DNA SPEC QL PROBE+SIG AMP: NEGATIVE
CLUE CELLS: NORMAL
HCG UR QL: POSITIVE
N GONORRHOEA DNA SPEC QL NAA+PROBE: NEGATIVE
TRICHOMONAS, WET PREP: NORMAL
WBC'S/HIGH POWER FIELD, WET PREP: NORMAL
YEAST, WET PREP: NORMAL

## 2024-01-19 PROCEDURE — 81025 URINE PREGNANCY TEST: CPT | Performed by: OBSTETRICS & GYNECOLOGY

## 2024-01-19 PROCEDURE — 87491 CHLMYD TRACH DNA AMP PROBE: CPT | Performed by: OBSTETRICS & GYNECOLOGY

## 2024-01-19 PROCEDURE — 87591 N.GONORRHOEAE DNA AMP PROB: CPT | Performed by: OBSTETRICS & GYNECOLOGY

## 2024-01-19 PROCEDURE — 99203 OFFICE O/P NEW LOW 30 MIN: CPT | Performed by: OBSTETRICS & GYNECOLOGY

## 2024-01-19 PROCEDURE — 87210 SMEAR WET MOUNT SALINE/INK: CPT | Performed by: OBSTETRICS & GYNECOLOGY

## 2024-01-19 RX ORDER — PRENATAL VIT/IRON FUM/FOLIC AC 27MG-0.8MG
1 TABLET ORAL DAILY
Qty: 90 TABLET | Refills: 3 | Status: SHIPPED | OUTPATIENT
Start: 2024-01-19

## 2024-01-19 NOTE — PROGRESS NOTES
GYN Clinic Visit  Date of visit: 2024   Chief Complaint: recurrent Bartholin's, early pregnancy    HPI:   Derek Dwyer is a 23 year old  at 6w5d female who I was asked to see in consultation by Dr Larios for evaluation of recurrent Bartholin's. Incidentally newly pregnant today.    On and off issue with Bartholin gland  Started was in  - 1 episodes  In  had 5 episodes  Always on the right side  Feels okay today  Last time was     Incision and drainage x3 - most recently 10/3/23, 23  Has not had a Word catheter  No h/o STI  No abnormal discharge    Patient's last menstrual period was 2023 (exact date).   Was not feeling well. Positive pregnancy test 1/15.  Feeling tired and nausea     H/o pre-viable PPROM with fetal demise in previous pregnancy in  - Pre-viable PPROM diagnosed, there was anhydramnios on US and significant vaginal bleeding.  FHT present initially on arrival, however with significant vaginal bleeding and concern for inevitable  she was admitted for IOL. She was 3 cm dilated on admission, augmented with oxytocin.  BSUS later peformed and IUFD diagnosed. She delivered via , placenta delivered spontaneously and intact.  No HR at delivery.       Obstetric History:   OB History    Para Term  AB Living   1 1 0 1 0 0   SAB IAB Ectopic Multiple Live Births   0 0 0 0 0      # Outcome Date GA Lbr Gregorio/2nd Weight Sex Delivery Anes PTL Lv   1  19 22w3d  0.51 kg (1 lb 2 oz)  Vag-Spont None  FD      Birth Comments: stillbirth       Name: LESLEE DWYER-DEREK FD      Apgar1: 0  Apgar5: 0       Past Medical History:  History reviewed. No pertinent past medical history.    Past Surgical History:  History reviewed. No pertinent surgical history.      Medications:  No current outpatient medications on file.     No current facility-administered medications for this visit.       Allergy:  No Known Allergies  Patient denies food, latex or environmental  allergies.     Social History:  Social History     Tobacco Use    Smoking status: Never     Passive exposure: Current    Smokeless tobacco: Never   Vaping Use    Vaping Use: Never used   Substance Use Topics    Alcohol use: Never    Drug use: Never      Physical Exam:  Vitals:    24 0839   BP: 113/74   Pulse: 79   Temp: 97.6  F (36.4  C)   Weight: 64 kg (141 lb)     Body mass index is 26.45 kg/m .    Gen: healthy, alert, active, no distress  :   - external genitalia: vulva and perineum are normal without lesion, mass or erythema  - urethra: well supported urethra, no hypermobility, normal Skenes and Bartholins.   - bladder: no tenderness, no masses  - vagina: intact, rugated mucosa without lesions or abnormal discharge. Wet prep, GC/chlam collected  - cervix: normal, no lesions or abnormal discharge. Long and closed  - uterus: 6wk size, no masses or tenderness  - adnexa: no masses or tenderness  - rectal: deferred      Assessment:  Gume King is a 23 year old  at 6w5d who presents in consultation for recurrent Bartholin's abscesses. No evidence of cyst or abscess on today's exam. Incidentally newly pregnant. H/o previable PPROM and fetal demise at 22 weeks.     Plan:  1. Bartholin's gland infection    - Ob/Gyn  Referral    2. Early stage of pregnancy  Recommend early ultrasound, establish prenatal care, start prenatal vitamin  Patient expresses concern about not having insurance or needing to pay for visit. Interested in talking with care coordinator/social work for financial/insurance assistane  - HCG qualitative urine; Future  - US OB Transvaginal Only; Future  - Primary Care - Care Coordination Referral; Future  - Prenatal Vit-Fe Fumarate-FA (PRENATAL MULTIVITAMIN W/IRON) 27-0.8 MG tablet; Take 1 tablet by mouth daily  Dispense: 90 tablet; Refill: 3  - HCG qualitative urine    3. History of  premature rupture of membranes (PPROM)  Recommend M consult for h/o previable PPROM and IUFD  at 22w  - [facility] Referral - Pregnancy; Future    4. Prior pregnancy with fetal demise and current pregnancy in first trimester    - [facility] Referral - Pregnancy; Future    5. Routine screening for STI (sexually transmitted infection)    - Wet prep - Clinic Collect  - Chlamydia trachomatis/Neisseria gonorrhoeae by PCR - Clinic Collect      Follow up: establish prenatal care     Due to language barrier, a phone  was used during the history-taking, exam and subsequent discussion with this patient.      Nasreen Hodge MD

## 2024-01-19 NOTE — Clinical Note
Please call pt with interpeter to help her get scheduled for early ultrasound to confirm dating (anytime now - 2 weeks from now) and routine new OB visits - RN intake and MD appts. Thanks Nasreen Hodge MD

## 2024-01-23 ENCOUNTER — PATIENT OUTREACH (OUTPATIENT)
Dept: CARE COORDINATION | Facility: CLINIC | Age: 24
End: 2024-01-23

## 2024-01-23 DIAGNOSIS — Z71.89 OTHER SPECIFIED COUNSELING: Primary | Chronic | ICD-10-CM

## 2024-01-23 NOTE — PROGRESS NOTES
Clinic Care Coordination Contact  Community Health Worker Initial Outreach    CHW Initial Information Gathering:  Referral Source: PCP  Preferred Urgent Care: [facility] - [place], [phone]  Current living arrangement:: I live in a private home with family (Patient lives with parents.)  Type of residence:: Private home - stairs  Community Resources: None  Informal Support system:: Parent, Family  No PCP office visit in Past Year: No  Transportation means:: Regular car  CHW Additional Questions  If ED/Hospital discharge, follow-up appointment scheduled as recommended?: N/A  Medication changes made following ED/Hospital discharge?: N/A  MyChart active?: Yes  Patient sent Social Determinants of Health questionnaire?: Yes    Spoke to Patient (Verdi)  CHW Introduced intent of call regarding PCP referral  Patients reports support is needed with obtaining Health Insurance.   CHW introduce Clinic Care Coordination and Patient responded that she is interested in getting connected with [facility] and W.    CHW acknowledged and scheduled Patient for an CCC Phone Visit with [facility] SW for an [facility] Home Assessment on 01/26/2024 at 11:00 AM. Patient acknowledged.   CHW acknowledged and provided Patient with CHW contact information for additional support needed.     Patient accepts CC: Yes. Patient scheduled for assessment with [facility] SW on 01/26/2024 at 11:00AM. Patient noted desire to discuss obtaining health insurance.     JADON Garcia  Clinic Care Coordination   [facility]   Phone: [phone]  [email or web address]

## 2024-01-25 ENCOUNTER — PATIENT OUTREACH (OUTPATIENT)
Dept: CARE COORDINATION | Facility: CLINIC | Age: 24
End: 2024-01-25

## 2024-01-26 ENCOUNTER — PATIENT OUTREACH (OUTPATIENT)
Dept: NURSING | Facility: CLINIC | Age: 24
End: 2024-01-26

## 2024-01-26 ENCOUNTER — PATIENT OUTREACH (OUTPATIENT)
Dept: CARE COORDINATION | Facility: CLINIC | Age: 24
End: 2024-01-26

## 2024-01-26 ENCOUNTER — OFFICE VISIT (OUTPATIENT)
Dept: OBGYN | Facility: CLINIC | Age: 24
End: 2024-01-26
Attending: OBSTETRICS & GYNECOLOGY

## 2024-01-26 ENCOUNTER — ANCILLARY PROCEDURE (OUTPATIENT)
Dept: ULTRASOUND IMAGING | Facility: CLINIC | Age: 24
End: 2024-01-26
Attending: OBSTETRICS & GYNECOLOGY

## 2024-01-26 VITALS
HEART RATE: 68 BPM | DIASTOLIC BLOOD PRESSURE: 70 MMHG | BODY MASS INDEX: 26.19 KG/M2 | SYSTOLIC BLOOD PRESSURE: 97 MMHG | OXYGEN SATURATION: 99 % | WEIGHT: 139.6 LBS

## 2024-01-26 DIAGNOSIS — O21.9 NAUSEA AND VOMITING IN PREGNANCY: Primary | ICD-10-CM

## 2024-01-26 DIAGNOSIS — K59.00 CONSTIPATION IN PREGNANCY IN FIRST TRIMESTER: ICD-10-CM

## 2024-01-26 DIAGNOSIS — Z34.90 EARLY STAGE OF PREGNANCY: ICD-10-CM

## 2024-01-26 DIAGNOSIS — Z59.9 FINANCIAL DIFFICULTIES: Primary | ICD-10-CM

## 2024-01-26 DIAGNOSIS — O99.611 CONSTIPATION IN PREGNANCY IN FIRST TRIMESTER: ICD-10-CM

## 2024-01-26 PROCEDURE — 76817 TRANSVAGINAL US OBSTETRIC: CPT | Performed by: OBSTETRICS & GYNECOLOGY

## 2024-01-26 PROCEDURE — 99213 OFFICE O/P EST LOW 20 MIN: CPT

## 2024-01-26 RX ORDER — PYRIDOXINE HCL (VITAMIN B6) 25 MG
25 TABLET ORAL 3 TIMES DAILY
Qty: 90 TABLET | Refills: 3 | Status: SHIPPED | OUTPATIENT
Start: 2024-01-26 | End: 2024-05-08

## 2024-01-26 RX ORDER — POLYETHYLENE GLYCOL 3350 17 G/17G
1 POWDER, FOR SOLUTION ORAL DAILY
Qty: 238 G | Refills: 1 | Status: SHIPPED | OUTPATIENT
Start: 2024-01-26 | End: 2024-02-07

## 2024-01-26 RX ORDER — DOCUSATE SODIUM 100 MG/1
100 CAPSULE, LIQUID FILLED ORAL DAILY
Qty: 90 CAPSULE | Refills: 3 | Status: SHIPPED | OUTPATIENT
Start: 2024-01-26 | End: 2024-02-07

## 2024-01-26 SDOH — ECONOMIC STABILITY - INCOME SECURITY: PROBLEM RELATED TO HOUSING AND ECONOMIC CIRCUMSTANCES, UNSPECIFIED: Z59.9

## 2024-01-26 NOTE — PROGRESS NOTES
Clinic Care Coordination Contact      CCSW completed order for FRW. Closed CCC episode.     SHERRIE oGmez, [facility]  Social Work Care Coordinator

## 2024-01-26 NOTE — PROGRESS NOTES
CC: early pregnancy US review  S: Gume is a 24 yo  at 7.5 ega by lmp 23 with trung 24  -very excited  -feeling nausea, exacerbated by being active at work  -concerned with her intake, feeling like she is not eating enough   -denies vag bldg  -denies dysuria  -feeling very constipated  -hx PPROM 22.3 with IUFD   -taking pnv    O:BP 97/70   Pulse 68   Wt 63.3 kg (139 lb 9.6 oz)   LMP 2023 (Exact Date)   SpO2 99%   BMI 26.19 kg/m    No past medical history on file.  No past surgical history on file.  Current Outpatient Medications   Medication    docusate sodium (COLACE) 100 MG capsule    doxylamine (UNISOM) 25 MG TABS tablet    polyethylene glycol (MIRALAX) 17 GM/Dose powder    Prenatal Vit-Fe Fumarate-FA (PRENATAL MULTIVITAMIN W/IRON) 27-0.8 MG tablet    pyridOXINE (VITAMIN B6) 25 MG tablet     No current facility-administered medications for this visit.      No Known Allergies  Alert happy pt NAD  RRR  Normal bp and pulse  ROS + nausea and constipation    Narrative & Impression      US OB Transvaginal Only  Order #: [id number] Accession #: [id number]  Study Notes      Maggie Bridges on 2024  8:57 AM      Obstetrical Ultrasound Report  OB U/S  < 14 Weeks -  Transvaginal   [facility]  Referring Provider: Nasreen Hodge MD  Sonographer: Maggie Bridges RDMS  Indication:  Viability check      Dating (mm/dd/yyyy):   LMP: 2023            EDC:  2024            GA by LMP:         7w5d     Current Scan On:  2024          EDC:  09/10/2024            GA by Current Scan:        7w3d  The calculation of the gestational age by current scan was based on CRL.  Anatomy Scan:  Jose gestation.  Biometry:  CRL                       1.29 cm                7w3d                      Yolk Sac                              2.4 mm                                                   Fetal heart activity:  Rate and rhythm is within normal  limits.  Fetal heart rate: 157 bpm  Findings:      Maternal Structures:  Cervix: The cervix appears long and closed.  Right Ovary: Corpus luteum  Left Ovary: Wnl  Technique:Transvaginal Imaging performed  Impression:      Early de jesus IUP with yolk sac and cardiac activity, measures 7w 3d by today's ultrasound, EDC 9/10/2024.  This is consistent with LMP dates and EDC should not be adjusted.      JADA CRESPO MD        A:Dania is a 24 yo  at 7.5 ega by lmp 23 with trung 24  P:  -reviewed US s=d c/w LMP ega 7.5 trung remains 24  -has RN intake scheduled  -has NOB scheduled   -MFM ref due to PPROM with IUFD/genetics  -reviewed RN triage/my chart for contact  1. Nausea and vomiting in pregnancy  -call if nausea not relieved  -discussed if concern for weight gain persistent nausea would make more aggressive recc  -small frequent meals, avoid consuming a large amount of water with meals  - pyridOXINE (VITAMIN B6) 25 MG tablet; Take 1 tablet (25 mg) by mouth 3 times daily  Dispense: 90 tablet; Refill: 3  - doxylamine (UNISOM) 25 MG TABS tablet; Take 0.5 tablets (12.5 mg) by mouth at bedtime  Dispense: 90 tablet; Refill: 1    2. Constipation in pregnancy in first trimester  -Take colace up to two times daily  -add miralax as needed if constipation not relieved  - docusate sodium (COLACE) 100 MG capsule; Take 1 capsule (100 mg) by mouth daily  Dispense: 90 capsule; Refill: 3  - polyethylene glycol (MIRALAX) 17 GM/Dose powder; Take 17 g (1 Capful) by mouth daily  Dispense: 238 g; Refill: 1    Rtc for PNC as scheduled  NITA Dang CNP

## 2024-02-07 ENCOUNTER — VIRTUAL VISIT (OUTPATIENT)
Dept: OBGYN | Facility: CLINIC | Age: 24
End: 2024-02-07
Payer: COMMERCIAL

## 2024-02-07 VITALS — HEIGHT: 61 IN | BODY MASS INDEX: 26.38 KG/M2

## 2024-02-07 DIAGNOSIS — Z83.3 FAMILY HISTORY OF DIABETES MELLITUS IN MOTHER: ICD-10-CM

## 2024-02-07 DIAGNOSIS — Z37.1 DELIVERY OUTCOME OF SINGLE STILLBORN INFANT: ICD-10-CM

## 2024-02-07 DIAGNOSIS — Z34.90 ENCOUNTER FOR SUPERVISION OF NORMAL PREGNANCY: Primary | ICD-10-CM

## 2024-02-07 DIAGNOSIS — Z23 NEED FOR TDAP VACCINATION: ICD-10-CM

## 2024-02-07 PROBLEM — K59.00 CONSTIPATION: Status: ACTIVE | Noted: 2023-10-03

## 2024-02-07 PROBLEM — R10.9 ABDOMINAL PAIN: Status: ACTIVE | Noted: 2023-10-03

## 2024-02-07 PROBLEM — R19.8 PAIN WITH BOWEL MOVEMENTS: Status: RESOLVED | Noted: 2018-01-04 | Resolved: 2024-02-07

## 2024-02-07 PROBLEM — K64.4 SKIN TAG OF PERIANAL REGION: Status: RESOLVED | Noted: 2018-01-04 | Resolved: 2024-02-07

## 2024-02-07 PROCEDURE — 99207 PR NO CHARGE NURSE ONLY: CPT | Mod: 93

## 2024-02-07 NOTE — PROGRESS NOTES
Important Information for Provider:     New ob nurse intake by phone, second pregnancy. History of stillborn at 22 weeks 7/09/2019  Ultrasound performed 1/26/2024, consistent with LMP. NOB with Dr Moran 2/12/2024  Handouts reviewed. MFM scheduled for 2/27/2024  A1C ordered with NOB labs, family history of diabetes. Patient takes B6, Unisom for nausea. Constipation has improved she increased her fruits/ vegetables and will take metamucil if needed    Caffeine intake/servings daily - 0  Calcium intake/servings daily - 3  Exercise 5 times weekly - describe ; works 10 hours a day, walks, precautions given  Sunscreen used - Yes  Seatbelts used - Yes  Guns stored in the home - No  Self Breast Exam - Yes  Pap test up to date -  Yes  Dental exam up to date -  Yes    Immunizations reviewed and up to date - Yes  Abuse: Current or Past (Physical, Sexual or Emotional) - No  Do you feel safe in your environment - Yes  Do you cope well with stress - Yes          Prenatal OB Questionnaire    Patient supplied answers from flow sheet for:  Prenatal OB Questionnaire.  Past Medical History  Have you ever received care for your mental health? : No  Have you ever been in a major accident or suffered serious trauma?: No  Within the last year, has anyone hit, slapped, kicked or otherwise hurt you?: No  In the last year, has anyone forced you to have sex when you didn't want to?: No    Past Medical History 2   Have you ever received a blood transfusion?: No  Would you accept a blood transfusion if was medically recommended?: Yes  Does anyone in your home smoke?: (!) Yes   Is your blood type Rh negative?: No  Have you ever ?: No  Have you been hospitalized for a nonsurgical reason excluding normal delivery?: No  Have you ever had an abnormal pap smear?: No    Past Medical History (Continued)  Do you have a history of abnormalities of the uterus?: No  Did your mother take JERAMIE or any other hormones when she was pregnant with you?:  No  Do you have any other problems we have not asked about which you feel may be important to this pregnancy?: No                                   Allergies as of 2/7/2024:    Allergies as of 02/07/2024    (No Known Allergies)               Early ultrasound screening tool:    Does patient have irregular periods?  No  Did patient use hormonal birth control in the three months prior to positive urine pregnancy test? No  Is the patient breastfeeding?  No  Is the patient 10 weeks or greater at time of education visit?  No

## 2024-02-08 NOTE — PATIENT INSTRUCTIONS
Weeks 10 to 14 of Your Pregnancy: Care Instructions  It's now possible to hear the fetus's heartbeat with a special ultrasound device. And the fetus's organs are developing.    Decide about tests to check for birth defects. Think about your age, your chance of passing on a family disease, your need to know about any problems, and what you might do after you have the test results.   It's okay to exercise. Try activities such as walking or swimming. Check with your doctor before starting a new program.     You may feel more tired than usual.  Taking naps during the day may help.     You may feel emotional.  It might help to talk to someone.     You may have headaches.  Try lying down and putting a cool cloth over your forehead.     You can use acetaminophen (Tylenol) for pain relief.  Don't take any anti-inflammatory medicines (such as Advil, Motrin, Aleve), unless your doctor says it's okay.     You may feel a fullness or aching in your lower belly.  This can feel like the kind of cramps you might get before a period. A back rub may help.     You may need to urinate more.  Your growing uterus and changing hormones can affect your bladder.     You may feel sick to your stomach (morning sickness).  Try avoiding food and smells that make you feel sick.     Your breasts may feel different.  They may feel tender or get bigger. Your nipples may get darker. Try a bra that gives you good support.     Avoid alcohol, tobacco, and drugs (including marijuana).  If you need help quitting, talk to your doctor.     Take a daily prenatal vitamin.  Choose one with folic acid.   Follow-up care is a key part of your treatment and safety. Be sure to make and go to all appointments, and call your doctor if you are having problems. It's also a good idea to know your test results and keep a list of the medicines you take.  Where can you learn more?  Go to https://www.healthwise.net/patiented  Enter E090 in the search box to learn more  "about \"Weeks 10 to 14 of Your Pregnancy: Care Instructions.\"  Current as of: July 11, 2023               Content Version: 13.8    8617-5937 [employer].   Care instructions adapted under license by your healthcare professional. If you have questions about a medical condition or this instruction, always ask your healthcare professional. [employer] disclaims any warranty or liability for your use of this information.        Nutrition During Pregnancy: Care Instructions  Overview     Healthy eating when you are pregnant is important for you and your baby. It can help you feel well and have a successful pregnancy and delivery. During pregnancy your nutrition needs increase. Even if you have excellent eating habits, your doctor may recommend a multivitamin to make sure you get enough iron and folic acid.  You may wonder how much weight you should gain. In general, if you were at a healthy weight before you became pregnant, then you should gain between 25 and 35 pounds. If you were overweight before pregnancy, then you'll likely be advised to gain 15 to 25 pounds. If you were underweight before pregnancy, then you'll probably be advised to gain 28 to 40 pounds. Your doctor will work with you to set a weight goal that is right for you. Gaining a healthy amount of weight helps you have a healthy baby.  Follow-up care is a key part of your treatment and safety. Be sure to make and go to all appointments, and call your doctor if you are having problems. It's also a good idea to know your test results and keep a list of the medicines you take.  How can you care for yourself at home?  Eat plenty of fruits and vegetables. Include a variety of orange, yellow, and leafy dark-green vegetables every day.  Choose whole-grain bread, cereal, and pasta. Good choices include whole wheat bread, whole wheat pasta, brown rice, and oatmeal.  Get 4 or more servings of milk and milk products each day. Good choices " "include nonfat or low-fat milk, yogurt, and cheese. If you cannot eat milk products, you can get calcium from calcium-fortified products such as orange juice, soy milk, and tofu. Other non-milk sources of calcium include leafy green vegetables, such as broccoli, kale, mustard greens, turnip greens, bok jeff, and brussels sprouts.  If you eat meat, pick lower-fat types. Good choices include lean cuts of meat and chicken or turkey without the skin.  Do not eat shark, swordfish, karen mackerel, or tilefish. They have high levels of mercury, which is dangerous to your baby. You can eat up to 12 ounces a week of fish or shellfish that have low mercury levels. Good choices include shrimp, wild salmon, pollock, and catfish. Limit some other types of fish, such as white (albacore) tuna, to 4 oz (0.1 kg) a week.  Heat lunch meats (such as turkey, ham, or bologna) to 165 F before you eat them. This reduces your risk of getting sick from a kind of bacteria that can be found in lunch meats.  Do not eat unpasteurized soft cheeses, such as brie, feta, fresh mozzarella, and blue cheese. They have a bacteria that could harm your baby.  Limit caffeine. If you drink coffee or tea, have no more than 1 cup a day. Caffeine is also found in phil.  Do not drink any alcohol. No amount of alcohol has been found to be safe during pregnancy.  Do not diet or try to lose weight. For example, do not follow a low-carbohydrate diet. If you are overweight at the start of your pregnancy, your doctor will work with you to manage your weight gain.  Tell your doctor about all vitamins and supplements you take.  When should you call for help?  Watch closely for changes in your health, and be sure to contact your doctor if you have any problems.  Where can you learn more?  Go to https://www.healthwise.net/patiented  Enter Y785 in the search box to learn more about \"Nutrition During Pregnancy: Care Instructions.\"  Current as of: February 28, " 2023               Content Version: 13.8    7356-6616 [employer].   Care instructions adapted under license by your healthcare professional. If you have questions about a medical condition or this instruction, always ask your healthcare professional. [employer] disclaims any warranty or liability for your use of this information.      Exercise During Pregnancy: Care Instructions  Overview     Exercise is good for you during a healthy pregnancy. It can relieve back pain, swelling, and other discomforts. It also prepares your muscles for childbirth. And exercise can improve your energy level and help you sleep better.  If your doctor advises it, get more exercise. For example, walking is a good choice. Bit by bit, increase the amount you walk every day. Try for at least 30 minutes on most days of the week. You could also try a prenatal exercise class. But if you do not already exercise, be sure to talk with your doctor before you start a new exercise program. Doctors do not recommend contact sports during pregnancy.  Follow-up care is a key part of your treatment and safety. Be sure to make and go to all appointments, and call your doctor if you are having problems. It's also a good idea to know your test results and keep a list of the medicines you take.  How can you care for yourself at home?  Talk with your doctor about the right kind of exercise for each stage of pregnancy.  Listen to your body to know if your exercise is at a safe level.  Do not become overheated while you exercise. High body temperature can be harmful. Avoid activities that can make your body too hot.  If you feel tired, take it easy. You might walk instead of run.  If you are used to strenuous exercise, ask your doctor how to know when it's time to slow down.  If you exercised before getting pregnant, you should be able to keep up your routine early in your pregnancy. Later in your pregnancy, you may want to switch  "to more gentle activities.  Drink plenty of fluids before, during, and after exercise.  Avoid contact sports, such as soccer and basketball. Also avoid risky activities. These include scuba diving, horseback riding, and exercising at a high altitude (above 6,000 feet). If you live in a place with a high altitude, talk to your doctor about how you can exercise safely.  Do not get overtired while you exercise. You should be able to talk while you work out.  After your fourth month of pregnancy, avoid exercises that require you to lie flat on your back on a hard surface. These include sit-ups and some yoga poses.  Get plenty of rest. You may be very tired while you are pregnant.  Where can you learn more?  Go to https://www.ShopPad.net/patiented  Enter S801 in the search box to learn more about \"Exercise During Pregnancy: Care Instructions.\"  Current as of: July 11, 2023               Content Version: 13.8    9907-5056 [employer].   Care instructions adapted under license by your healthcare professional. If you have questions about a medical condition or this instruction, always ask your healthcare professional. [employer] disclaims any warranty or liability for your use of this information.      Learning About Pregnancy and Obesity  How does your weight affect your pregnancy?     The basics of prenatal care are the same for everyone, regardless of size. You'll get what you need to have a healthy baby.  But your size can make a difference in a few things. You and your doctor will have to watch your pregnancy weight. Your weight may affect your labor and delivery.  You may have some extra doctor visits and tests. And you may have some tests earlier in your pregnancy. You'll need to pay close attention to things like blood pressure and the chance of getting gestational diabetes. (This is a type of diabetes that sometimes happens during pregnancy.) And close attention will be given to your " "developing baby.  Work with your doctor to get the care you need. Go to all your doctor visits, and follow your doctor's advice about what to do and what to avoid during pregnancy.  How much weight gain is healthy?  If you are very overweight (obese), experts recommend that you gain between 11 and 20 pounds. Your doctor will work with you to set a weight goal that's right for you. In some cases, your doctor may recommend that you not gain any weight.  How much extra food do you need to eat?  Although you may joke that you're \"eating for two\" during pregnancy, you don't need to eat twice as much food. How much you can eat depends on:  Your height.  How much you weigh when you get pregnant.  How active you are.  If you're carrying more than one fetus (multiple pregnancy).  In the first trimester, you'll probably need the same amount of calories as you did before you were pregnant. In general, in your second trimester, you need to eat about 340 extra calories a day. In your third trimester, you need to eat about 450 extra calories a day.  What can you do to have a healthy pregnancy?  The best things you can do for you and your baby are to eat healthy foods, get regular exercise, avoid alcohol and smoking, and go to your doctor visits.  Eat a variety of foods from all the food groups. Make sure to get enough calcium and folic acid.  You may want to work with a dietitian to help you plan healthy meals to get the right amount of calories for you.  If you didn't exercise much before you got pregnant, talk to your doctor about how you can slowly get more active. Your doctor may want to set up an exercise program with you.  Where can you learn more?  Go to https://www.Protek-dor.net/patiented  Enter B644 in the search box to learn more about \"Learning About Pregnancy and Obesity.\"  Current as of: July 11, 2023               Content Version: 13.8    8882-9526 [employer].   Care instructions adapted under license " by your healthcare professional. If you have questions about a medical condition or this instruction, always ask your healthcare professional. [employer] disclaims any warranty or liability for your use of this information.      You have been provided the CDC Warning Signs in Pregnancy document.    Additional copies can be found here: www.Wipit.com/755878.pdf

## 2024-02-11 LAB
ABO/RH(D): NORMAL
ANTIBODY SCREEN: NEGATIVE
SPECIMEN EXPIRATION DATE: NORMAL

## 2024-02-12 ENCOUNTER — PRE VISIT (OUTPATIENT)
Dept: MATERNAL FETAL MEDICINE | Facility: CLINIC | Age: 24
End: 2024-02-12

## 2024-02-12 ENCOUNTER — PRENATAL OFFICE VISIT (OUTPATIENT)
Dept: OBGYN | Facility: CLINIC | Age: 24
End: 2024-02-12
Payer: COMMERCIAL

## 2024-02-12 VITALS
OXYGEN SATURATION: 99 % | BODY MASS INDEX: 26.49 KG/M2 | DIASTOLIC BLOOD PRESSURE: 72 MMHG | SYSTOLIC BLOOD PRESSURE: 105 MMHG | WEIGHT: 140.2 LBS | HEART RATE: 66 BPM

## 2024-02-12 DIAGNOSIS — O09.291 PRIOR PREGNANCY WITH FETAL DEMISE AND CURRENT PREGNANCY IN FIRST TRIMESTER: Primary | ICD-10-CM

## 2024-02-12 DIAGNOSIS — Z83.3 FAMILY HISTORY OF DIABETES MELLITUS IN MOTHER: ICD-10-CM

## 2024-02-12 LAB
ALBUMIN UR-MCNC: NEGATIVE MG/DL
APPEARANCE UR: CLEAR
BILIRUB UR QL STRIP: NEGATIVE
COLOR UR AUTO: YELLOW
ERYTHROCYTE [DISTWIDTH] IN BLOOD BY AUTOMATED COUNT: 13.2 % (ref 10–15)
GLUCOSE UR STRIP-MCNC: NEGATIVE MG/DL
HBA1C MFR BLD: 5.4 % (ref 0–5.6)
HBV SURFACE AG SERPL QL IA: NONREACTIVE
HCT VFR BLD AUTO: 38.5 % (ref 35–47)
HCV AB SERPL QL IA: NONREACTIVE
HGB BLD-MCNC: 12.5 G/DL (ref 11.7–15.7)
HGB UR QL STRIP: NEGATIVE
HIV 1+2 AB+HIV1 P24 AG SERPL QL IA: NONREACTIVE
KETONES UR STRIP-MCNC: NEGATIVE MG/DL
LEUKOCYTE ESTERASE UR QL STRIP: ABNORMAL
MCH RBC QN AUTO: 25.8 PG (ref 26.5–33)
MCHC RBC AUTO-ENTMCNC: 32.5 G/DL (ref 31.5–36.5)
MCV RBC AUTO: 80 FL (ref 78–100)
NITRATE UR QL: NEGATIVE
PH UR STRIP: 6.5 [PH] (ref 5–7)
PLATELET # BLD AUTO: 292 10E3/UL (ref 150–450)
RBC # BLD AUTO: 4.84 10E6/UL (ref 3.8–5.2)
RUBV IGG SERPL QL IA: 3.34 INDEX
RUBV IGG SERPL QL IA: POSITIVE
SP GR UR STRIP: 1.02 (ref 1–1.03)
T PALLIDUM AB SER QL: NONREACTIVE
UROBILINOGEN UR STRIP-ACNC: 0.2 E.U./DL
WBC # BLD AUTO: 8.3 10E3/UL (ref 4–11)

## 2024-02-12 PROCEDURE — 86850 RBC ANTIBODY SCREEN: CPT

## 2024-02-12 PROCEDURE — 86780 TREPONEMA PALLIDUM: CPT

## 2024-02-12 PROCEDURE — 87340 HEPATITIS B SURFACE AG IA: CPT

## 2024-02-12 PROCEDURE — 81003 URINALYSIS AUTO W/O SCOPE: CPT

## 2024-02-12 PROCEDURE — 86803 HEPATITIS C AB TEST: CPT

## 2024-02-12 PROCEDURE — 87086 URINE CULTURE/COLONY COUNT: CPT

## 2024-02-12 PROCEDURE — 86901 BLOOD TYPING SEROLOGIC RH(D): CPT

## 2024-02-12 PROCEDURE — 85027 COMPLETE CBC AUTOMATED: CPT

## 2024-02-12 PROCEDURE — 86762 RUBELLA ANTIBODY: CPT

## 2024-02-12 PROCEDURE — 86900 BLOOD TYPING SEROLOGIC ABO: CPT

## 2024-02-12 PROCEDURE — 87389 HIV-1 AG W/HIV-1&-2 AB AG IA: CPT

## 2024-02-12 PROCEDURE — 99213 OFFICE O/P EST LOW 20 MIN: CPT

## 2024-02-12 PROCEDURE — 36415 COLL VENOUS BLD VENIPUNCTURE: CPT

## 2024-02-12 PROCEDURE — 83036 HEMOGLOBIN GLYCOSYLATED A1C: CPT

## 2024-02-12 NOTE — PROGRESS NOTES
SUBJECTIVE:     HPI:    This is a 23 year old female patient,  who presents for her first obstetrical visit.    DAPHNIE: 2024, by Last Menstrual Period.  She is 10w1d weeks.  Her cycles are regular.  Her last menstrual period was normal.   Since her LMP, she has experienced  nausea and loss of appetite).   She denies dysuria, pelvic pain, and vaginal bleeding.    Additional History: -hx previable PPROM 2019 22.3 fetal demise         HISTORY:   Planned Pregnancy: Yes  Marital Status: partner Malena  Occupation: [employer]   Living in Household: Significant Other    Past History:  Her past medical history is non-contributory.      She has a history of  pre-term delivery at 22.3 weeks    Since her last LMP she denies use of alcohol, tobacco and street drugs.    Past medical, surgical, social and family history were reviewed and updated in [facility].        Current Outpatient Medications   Medication    Prenatal Vit-Fe Fumarate-FA (PRENATAL MULTIVITAMIN W/IRON) 27-0.8 MG tablet    doxylamine (UNISOM) 25 MG TABS tablet    pyridOXINE (VITAMIN B6) 25 MG tablet     No current facility-administered medications for this visit.       ROS:   12 point review of systems negative other than symptoms noted below or in the HPI.      OBJECTIVE:     EXAM:  /72   Pulse 66   Wt 63.6 kg (140 lb 3.2 oz)   LMP 2023 (Exact Date)   SpO2 99%   BMI 26.49 kg/m   Body mass index is 26.49 kg/m .    GENERAL: alert and no distress  EYES: Eyes grossly normal to inspection, PERRL and conjunctivae and sclerae normal  NECK: no adenopathy, no asymmetry, masses, or scars  RESP: RRR  CV: regular rate and rhythm, no peripheral edema  ABDOMEN: soft, nontender, no hepatosplenomegaly, no masses and bowel sounds normal  MS: no gross musculoskeletal defects noted, no edema  SKIN: no suspicious lesions or rashes  NEURO: Normal strength and tone, mentation intact and speech normal  PSYCH: mentation appears normal, affect  normal/bright    ASSESSMENT/PLAN:       ICD-10-CM    1. Family history of diabetes mellitus in mother  Z83.3 Hemoglobin A1c     Hemoglobin A1c      2. Encounter for supervision of normal pregnancy  Z34.90 UA without Microscopic - lab collect     Hepatitis C antibody     ABO/Rh type and screen     Hepatitis B surface antigen     CBC with platelets     HIV Antigen Antibody Combo     Rubella Antibody IgG     Treponema Abs w Reflex to RPR and Titer     Urine Culture Aerobic Bacterial          23 year old , 10w1d weeks of pregnancy with DAPHNIE of 2024, by Last Menstrual Period    Discussed as follows:  -delivery at [facility] [place]   -M referral for hx PPROM PTD fetal demise at 22.3  -pap smear UTD  -recc flu, covid, tdap vacc this pregnancy  -anatomy scan 18-20 wks GA  -undecided genetics  -NOB labs today with A1c for fam hx DM  -works 4 10 hr days at [employer], discussed compression stockings, breaks, hydration, could switch to seated job but would have to lift 20-50lbs, discussed standing is probably safer, can write work accomodation note at any point in pregnancy  -discussed RN triage/ vs mychart for contact  -nausea improving, with diet mods  -recc compression stockings, maternity support belts as pregnancy progresses   -discussed ED care for emergent sx, vaginal bleeding, ctx, PPROM pre viability    Counseling given:   - Follow up in 4-6 weeks for return OB visit.  - Recommended weight gain for pregnancy: 15-25 lbs.         PLAN/PATIENT INSTRUCTIONS:       NITA Dang CNP

## 2024-02-13 LAB — BACTERIA UR CULT: NORMAL

## 2024-02-22 NOTE — PROGRESS NOTES
Maternal-Fetal Medicine Consultation     Derek Dwyer  : 2000  MRN: [id number]    Rerferral:  Derek Dwyer is a 23 year old sent by Dr. Hodge from [facility] for MFM consultation.    HPI     Derek Dwyer is a 23 year old  at 12w2d by LMP consistent with 7w3d US here for MFM consultation regarding history of previable PPROM at 22 weeks and 3 days with  demise.    In 2019, the patient presented to triage with vaginal bleeding and had a gush of fluid while in triage. She had not had any prenatal care or dating ultrasounds, thus was dated by a bedside ultrasound at about 22 weeks during this encounter. There was a fetal heart rate on admission with decelerations noted. However, she was found to be 3cm and breech, thus was recommended induction of labor for inevitable . There was a fetal demise during this induction and the patient delivered vaginally several hours later.    Patient is seen with a Kimberli .    Prenatal Care:  Primary OB care this pregnancy has been with Dr. Hodge from [facility].     OB History    Para Term  AB Living   2 1 0 1 0 0   SAB IAB Ectopic Multiple Live Births   0 0 0 0 0      # Outcome Date GA Lbr Gregorio/2nd Weight Sex Delivery Anes PTL Lv   2 Current            1  19 22w3d  0.51 kg (1 lb 2 oz)  Vag-Spont None  FD      Birth Comments: stillbirth       Name: LESLEE DWYER-DEREK UP      Apgar1: 0  Apgar5: 0     Gynecologic History   - Denies any history of abnormal pap smears  - Denies prior cervical surgery or procedures  - Denies any history of frequent UTIs, vaginal infections, or STIs    Past Medical History     Past Medical History:   Diagnosis Date    Varicella        Past Surgical History   No past surgical history on file.    Medication List     Prior to Admission medications    Medication Sig Last Dose Taking? Auth Provider Long Term End Date   doxylamine (UNISOM) 25 MG TABS tablet Take 0.5 tablets (12.5 mg) by mouth at bedtime  Patient not  "taking: Reported on 2024   Yolanda Moran APRN CNP     Prenatal Vit-Fe Fumarate-FA (PRENATAL MULTIVITAMIN W/IRON) 27-0.8 MG tablet Take 1 tablet by mouth daily   Nasreen Hodge MD     pyridOXINE (VITAMIN B6) 25 MG tablet Take 1 tablet (25 mg) by mouth 3 times daily  Patient not taking: Reported on 2024   Yolanda Moran APRN CNP         Allergies   Patient has no known allergies.    Social History   Denies use of alcohol, drugs or smoking.    Family History     Denies history of genetic disorders, preeclampsia, thromboembolic disease, bleeding disorders, developmental delay.     Review of Systems   10-point ROS negative except as in HPI.    Physical Exam   BP 97/66 (BP Location: Right arm, Patient Position: Sitting, Cuff Size: Adult Regular)   Pulse 67   Resp 20   LMP 2023 (Exact Date)   SpO2 98%   Gen: NAD  CV: Well perfused  Resp: Breathing comfortably  Abd: Gravid, non-tender  Ext: No edema    Ultrasound   See today's ultrasound report under the \"imaging\" tab.    Assessment/Counseling     Gume King is a 23 year old  at 12w2d by LMP consistent with 7w3d US here for MFM consultation regarding history of previable PPROM at 22 weeks and 3 days with  demise.    We discussed her obstetric history specifically regarding her previous  delivery. We discussed the incidence and epidemiology of  birth (PTB).  birth is defined as a delivery occurring at or after 20 0/7 weeks of gestation and before 37 0/7 weeks of gestation.  birth may be spontaneous (following  labor, PPROM, or cervical insufficiency) or it may be indicated by a specific maternal or fetal complication. Many factors have been associated with  birth, including maternal demographics and characteristics, social and economic factors, medical complications, obstetric history, and conditions specific to the current pregnancy. Several risk factors for  birth are " potentially modifiable, including low maternal prepregnancy weight, smoking, substance use, and short interpregnancy interval.     A history of  birth is a very strong predictor of subsequent  birth. The number of prior  births and the degree of prematurity at the prior birth significantly affect the recurrence risk of  birth. This risk of  birth is highest when the most recent pregnancy was complicated by  birth, or if the patient has a history of multiple  births. After one  birth, the risk of a recurrent  birth is as high as 30%. After two  births, the risk of recurrence is as high as 60%. In the majority of cases, a recurrent  birth will occur at a similar gestational age as the previous birth.    Clinical factors during a current pregnancy that have been associated with an increased risk of  birth include vaginal bleeding, urinary tract infections (UTIs), genital tract infections, multiple gestation, and short cervix.     We discussed that there historically have been two main strategies for  birth prevention in subsequent pregnancies following a spontaneous  delivery: progesterone supplementation and cervical length screening with cerclage placement for short cervix.    We reviewed that given the association of short cervix and  birth and the demonstrated efficacy of vaginal progesterone in the setting of short cervix - we would still recommend assessment of cervical length in the second trimester as this has been shown to identify women at increased risk for  birth. Cervical length less than 25 mm before 24 weeks of gestation has a sensitivity of 65.4% for  birth before 35 weeks of gestation. Therefore, in the event of cervical shortening <25mm cerclage placement can be offered as it has been shown to prolong pregnancy and improve  outcomes. Because of the relatively high detection  rate and predictive value in individuals with prior  birth, and because treatment is available, serial endovaginal ultrasound measurement of cervical length beginning at 16 0/7 weeks of gestation and repeated until 24 0/7 weeks of gestation for individuals with a de jesus pregnancy and a prior spontaneous  birth is recommended.     Recommendations   - Serial cervical length measurements every 2 weeks from 16-24 weeks gestation increasing frequency to weekly if cervical length is <30mm.   - If cervical shortening <25mm prior to 24 weeks recommend further counseling regarding treatment with vaginal progesterone vs cervical cerclage.  - Urine culture every trimester with aggressive treatment of bacteriuria  - Clinical evaluation of  labor symptoms.   - Consider administration of  corticosteroids if the patient is deemed to be at increased risk of imminent  delivery.    The patient was seen and evaluated with Dr. Brenda Sharma.    Thank you for allowing us to participate in the care of your patient. Please do not hesitate to contact us if you have further questions regarding the management of your patient.     Peyton Kc MD  Maternal Fetal Medicine Fellow       -------------                                                                                                          MFM Physician Attestation  I saw this patient with the resident/fellow and agree with the their findings and plan of care as documented in the note.  I personally reviewed her vital signs, medications, labs, pertinent imaging, and fetal monitoring.    I personally spent a total of 45 minutes, including both face-to-face and non-face-to-face on the date of the encounter, addressing the above diagnosis. Activities performed in this time include chart review, obtaining / reviewing history, performing a medically necessary evaluation, documentation and counseling/care coordination/ordering tests/ordering  meds.      Brenda Sharma MD  Maternal Fetal Medicine   Date of Service (when I saw the patient): 2/27/2024

## 2024-02-27 ENCOUNTER — HOSPITAL ENCOUNTER (OUTPATIENT)
Dept: ULTRASOUND IMAGING | Facility: CLINIC | Age: 24
Discharge: HOME | End: 2024-02-27
Attending: STUDENT IN AN ORGANIZED HEALTH CARE EDUCATION/TRAINING PROGRAM
Payer: COMMERCIAL

## 2024-02-27 ENCOUNTER — OFFICE VISIT (OUTPATIENT)
Dept: MATERNAL FETAL MEDICINE | Facility: CLINIC | Age: 24
End: 2024-02-27
Attending: STUDENT IN AN ORGANIZED HEALTH CARE EDUCATION/TRAINING PROGRAM
Payer: COMMERCIAL

## 2024-02-27 ENCOUNTER — LAB (OUTPATIENT)
Dept: LAB | Facility: CLINIC | Age: 24
End: 2024-02-27
Attending: STUDENT IN AN ORGANIZED HEALTH CARE EDUCATION/TRAINING PROGRAM
Payer: COMMERCIAL

## 2024-02-27 VITALS
DIASTOLIC BLOOD PRESSURE: 66 MMHG | OXYGEN SATURATION: 98 % | SYSTOLIC BLOOD PRESSURE: 97 MMHG | HEART RATE: 67 BPM | RESPIRATION RATE: 20 BRPM

## 2024-02-27 DIAGNOSIS — O09.299 PRIOR PREGNANCY WITH FETAL DEMISE: ICD-10-CM

## 2024-02-27 DIAGNOSIS — O09.899 HISTORY OF PRETERM DELIVERY, CURRENTLY PREGNANT: Primary | ICD-10-CM

## 2024-02-27 DIAGNOSIS — Z36.9 ENCOUNTER FOR ANTENATAL SCREENING OF MOTHER: Primary | ICD-10-CM

## 2024-02-27 DIAGNOSIS — Z36.9 ENCOUNTER FOR ANTENATAL SCREENING OF MOTHER: ICD-10-CM

## 2024-02-27 DIAGNOSIS — Z87.59 HISTORY OF IUFD: ICD-10-CM

## 2024-02-27 PROCEDURE — 76813 OB US NUCHAL MEAS 1 GEST: CPT

## 2024-02-27 PROCEDURE — G0463 HOSPITAL OUTPT CLINIC VISIT: HCPCS | Mod: 25 | Performed by: STUDENT IN AN ORGANIZED HEALTH CARE EDUCATION/TRAINING PROGRAM

## 2024-02-27 PROCEDURE — 76813 OB US NUCHAL MEAS 1 GEST: CPT | Mod: 26 | Performed by: STUDENT IN AN ORGANIZED HEALTH CARE EDUCATION/TRAINING PROGRAM

## 2024-02-27 PROCEDURE — 99204 OFFICE O/P NEW MOD 45 MIN: CPT | Mod: 25 | Performed by: STUDENT IN AN ORGANIZED HEALTH CARE EDUCATION/TRAINING PROGRAM

## 2024-02-27 PROCEDURE — 96040 HC GENETIC COUNSELING, EACH 30 MINUTES: CPT

## 2024-02-27 PROCEDURE — 36415 COLL VENOUS BLD VENIPUNCTURE: CPT

## 2024-02-27 NOTE — PROGRESS NOTES
[facility] Fetal Medicine [place]  Genetic Counseling Consult    Patient:  Gume King  Preferred Name: Gume YOB: 2000   Date of Service:  24   MRN: [id number]    Gume was seen at the [facility] Fetal [facility] for genetic consultation. The indication for genetic counseling is desire to discuss options for genetic screening and diagnostics. The patient was unaccompanied to this visit.     The session was conducted with a Kimberli ipad  (ID: [id number]) due to the patient speaking limited English.      IMPRESSION/ PLAN   1. Gume has not had genetic screening in this pregnancy but elected to have screening today.     2. During today's [facility] visit, Gume had a blood draw for non-invasive prenatal testing (also called NIPT, NIPS, or cell-free DNA) through [facility]). This NIPT screens for trisomy 21, 18, and 13 and the patient opted to screen for sex chromosome aneuploidies, including reported fetal sex. Results are expected in 7-10 days. The patient will be called with results and if they do not answer they requested a detailed message with results on their voicemail, including the predicted fetal sex information.  Patient was informed that results, including fetal sex, will be available in [facility].    3. Since the patient chose aneuploidy screening via NIPT, quad screen is NOT recommended in the second trimester. If the patient desires screening for open neural tube defects, maternal serum AFP only is recommended, ideally between 16-18 weeks gestation.    4. Gume had a nuchal translucency ultrasound today. Please see the ultrasound report for further details. Gume also had a MFM consultation today. Please see the consult note for more details.     5. Further recommendations include a fetal anatomy level II ultrasound with MF. The upcoming ultrasound has been scheduled for 2024.    PREGNANCY HISTORY   /Parity:       Gume's  "pregnancy history is significant for:   2019: Pregnancy, complicated by PPROM/PTL resulting in IUFD at 22w. This pregnancy was conceived with a different partner than the current pregnancy.     CURRENT PREGNANCY   Current Age: 23 year old   Age at Delivery: 24 year old  DAPHNIE: 9/8/2024, by Last Menstrual Period                                   Gestational Age: 12w2d  This pregnancy is a single gestation.   This pregnancy was conceived spontaneously.    MEDICAL HISTORY   Gume s reported medical history is not expected to impact pregnancy management or risks to fetal development.       FAMILY HISTORY   A three-generation pedigree was obtained today and is scanned under the \"Media\" tab in Epic. The family history was reported by Gume.    The following significant findings were reported today:   Gume's partner and the father of this pregnancy, Malena, is currently 29 years old and healthy. Malena has two daughters with a previous partner, both of which are healthy.     Gume reports that she has a 6 year old maternal half brother who was evaluated by the [facility] pediatric genetics team for some type of overgrowth or overweight condition. Gume reports that her maternal half brother underwent some type of genetic testing, which was negative, but was told to follow-up in 3-4 years. Gume did not have any other details regarding her brother's medical history or what type of genetic testing he had. With this limited information it is difficult to provide any sort of risk assessment to the current or future pregnancies, however, if more is learned about her brother medical history, a formal diagnosis is made, or if future genetic testing identifies a molecular variant; Gume was encouraged to contact us to update the family history so a more accurate prenatal risk assessment can be performed.     Otherwise, the reported family history is unremarkable for multiple miscarriages, stillbirths, birth defects, " intellectual disabilities, known genetic conditions, and consanguinity.       RISK ASSESSMENT FOR INHERITED CONDITIONS AND CARRIER SCREENING OPTIONS   Expanded carrier screening is available to screen for autosomal recessive conditions and X-linked conditions in a large list of genes. Carrier screening does not test the pregnancy but gives a risk assessment for the pregnancy and future pregnancies to have the condition. Expanded carrier screening is designed to identify carrier status for conditions that are primarily childhood or adolescent onset. Expanded carrier screening does not evaluate for adult-onset conditions such as hereditary cancer syndromes, dementia/ Alzheimer's disease, or cardiovascular disease risk factors. Additionally, expanded carrier screening is not comprehensive for all known genetic diseases or inherited conditions. Carrier screening does not test for all genetic and health conditions or risk factors.     Autosomal recessive conditions happen when a mutation has been inherited from the egg and sperm and include conditions like cystic fibrosis, thalassemia, hearing loss, spinal muscular atrophy, and more. We reviewed that when both biological parents carry a harmful genetic change in a gene associated with autosomal recessive inheritance, each of their pregnancies has a 1 in 4 (25%) chance to be affected by that condition. X-linked conditions happen when a mutation has been inherited from the egg and include conditions like fragile X syndrome.With x-linked conditions, the specific risk generally depends on the chromosomal sex of the fetus, with XY individuals (generally male) being most severely affected.      screening was reviewed. About [place] Screening    The patient does NOT have a family history of known inherited conditions. This does NOT mean the patient and/or their partner is not a carrier of a condition. Approximately 90% of couples at an increased reproductive risk for  an inherited condition have no family history of that condition.     The patient has not had carrier screening previously.     The patient declined the carrier screening options. They are aware the option will remain, and they can contact us if they would like to pursue screening. See below for the more detailed information we dicussed.  Carrier screening does not test the pregnancy but gives a risk assessment for the pregnancy and future pregnancies to have the condition  There are different size panels or list of conditions for carrier screening.   Some conditions cause health problems for carriers.   The results typically take 2-3 weeks.     RISK ASSESSMENT FOR CHROMOSOME CONDITIONS   We explained that the risk for fetal chromosome abnormalities increases with maternal age. We discussed specific features of common chromosome abnormalities, including trisomy 21 (Down syndrome), trisomy 13, trisomy 18, and sex chromosome trisomies.    At age 24 at midtrimester, the risk to have a baby with Down syndrome is 1 in 1114.  At age 24 at midtrimester, the risk to have a baby with any chromosome abnormality is 1 in 557.   At age 24 at delivery, the risk to have a baby with Down syndrome is 1 in 1429.   At age 24 at delivery, the risk to have a baby with any chromosome abnormality is 1 in 500.     Gume has not had genetic screening in this pregnancy but elected to have screening today.      GENETIC TESTING OPTIONS   Genetic testing during a pregnancy includes screening and diagnostic procedures.      Screening tests are non-invasive which means no risk to the pregnancy and includes ultrasounds and blood work. The benefits and limitations of screening were reviewed. Screening tests provide a risk assessment (chance) specific to the pregnancy for certain fetal chromosome abnormalities but cannot definitively diagnose or exclude a fetal chromosome abnormality. Follow-up genetic counseling and consideration of diagnostic  testing is recommended with any abnormal screening result. Diagnostic testing during a pregnancy is more certain and can test for more conditions. However, the tests do have a risk of miscarriage that requires careful consideration. These tests can detect fetal chromosome abnormalities with greater than 99% certainty. Results can be compromised by maternal cell contamination or mosaicism and are limited by the resolution of current genetic testing technology.     There is no screening or diagnostic test that detects all forms of birth defects or intellectual disability.     We discussed the following screening options:     Non-invasive prenatal testing (NIPT)  Also called cell-free DNA screening because it detects chromosomes from the placenta in the pregnant person's blood  Can be done any time after 10 weeks gestation  Standard recommendation for NIPT screens for trisomy 21, trisomy 18, trisomy 13, with the option of adding sex chromosome aneuploidies, without or without predicted sex  Cannot screen for open neural tube defects, maternal serum AFP after 15 weeks is recommended  New NIPT options include screening for other trisomies, microdeletion syndromes, and in some cases fetal blood antigens. Guidelines do not recommend these conditions are included in standard screening. These options have limitations and should be discussed with a genetic counselor.   However, current (2023) ACMG guidelines do recommend that screening for one microdeletion syndrome, called 22q11.2 deletion syndrome be offered to all pregnant patients. 22q11.2 deletion syndrome has an estimated prevalence of 1 in 990 to 1 in 2148 (0.05-0.1%). Risk is not thought to increase with maternal age. Clinical features are variable but include congenital heart defects, cleft palate, developmental delays, immune system deficiencies, and hearing loss. Approximately 90% of cases are de evelia (a sporadic new change in a pregnancy). Cell-free DNA screening  for 22q11.2 deletion syndrome is available with the inclusion of other microdeletion syndromes. There is less data about the performance of cell-free DNA screening for more rare microdeletions and the chance for false positives or negative may be increased.  We discussed the limitations of cell-free DNA screening in detecting microdeletions and the possibility of false positives and false negatives. The patient declined microdeletion syndrome screening.    We discussed the following ultrasound options:    Nuchal translucency (NT) ultrasound  Ultrasound between [id number] that includes nuchal translucency measurement and nasal bone assessments  Nuchal translucency refers to the space at the back of the neck where fluid builds up. All babies at this stage have fluid and there is only concern if there is too much fluid  Nasal bone refers to the small bone in the nose. There is concern for conditions like Down syndrome if the bone cannot be seen at all  This ultrasound can be done as part of first trimester screening, at the same time as another screen (NIPT), at the same time as a CVS, or if the patients does not want genetic screening.  Markers on ultrasound detects about 70% of pregnancies with aneuploidy  Abnormalities on NT ultrasound can also increase the risk for a birth defect, like a heart defect    Comprehensive level II ultrasound (Fetal Anatomy Ultrasound)  Ultrasound done between 18-20 weeks gestation  Screens for major birth defects and markers for aneuploidy (like trisomy 21 and trisomy 18)  Includes looking at the fetus/baby's growth, heart, organs (stomach, kidneys), placenta, and amniotic fluid    We discussed the following diagnostic options:     Chorionic villus sampling (CVS)  Invasive diagnostic procedure done between 10w0d and 13w6d  The procedure collects a small sample from the placenta for the purpose of chromosomal testing and/or other genetic testing  Diagnostic result; more than 99%  sensitivity for fetal chromosome abnormalities  Cannot screen for open neural tube defects, maternal serum AFP after 15 weeks is recommended    Amniocentesis  Invasive diagnostic procedure done after 15 weeks gestation  The procedure collects a small sample of amniotic fluid for the purpose of chromosomal testing and/or other genetic testing  Diagnostic result; more than 99% sensitivity for fetal chromosome abnormalities  Testing for AFP in the amniotic fluid can test for open neural tube defects    It was a pleasure to be involved with [place] s care. Face-to-face time of the meeting was 45 minutes.    Marina Jimenez GC, MS, LGC  Board Certified and [place] Licensed Genetic Counselor  [facility]  Maternal Fetal Medicine  Office: [phone]  [facility]: [phone]   Fax: [phone]  [facility]

## 2024-02-27 NOTE — NURSING NOTE
[place] was seen in MFM Clinic today for GC/NT/MFM consult.   Dr. BEHZAD Sharma and Dr. Kc in to see patient.   Follow up orders placed, please see MFM consult note for recommendations.  Patient was discharged ambulatory and stable.

## 2024-03-11 ENCOUNTER — TELEPHONE (OUTPATIENT)
Dept: MATERNAL FETAL MEDICINE | Facility: CLINIC | Age: 24
End: 2024-03-11

## 2024-03-11 LAB — SCANNED LAB RESULT: NORMAL

## 2024-03-11 NOTE — TELEPHONE ENCOUNTER
March 11, 2024     I spoke with Gume King regarding her NIPT results with the assistance of a Kimberli  (Fernando Pengfarhan, ID: [id number]).      Results indicate NO ANEUPLOIDY DETECTED for chromosomes 21, 18, 13, or the sex chromosomes (XX).      This puts her current pregnancy at low risk for Down syndrome, trisomy 18, trisomy 13, and sex chromosome abnormalities. This test is reported to have the following sensitivities: Down syndrome- >99.7%, trisomy 18- >97.9%, and trisomy 13- >99.0%. Although these results are reassuring, this does not replace a standard chromosome analysis from a chorionic villus sampling or amniocentesis.      MSAFP is the appropriate second trimester screening test for open neural tube defects; the maternal quad screen is not recommended.     Her results are available in her Epic chart for her primary OB to review.     Marina Jimenez MS, [facility]  Certified and Licensed Genetic Counselor  [facility]  Maternal Fetal Medicine  Office: [phone]  [facility]: [phone]   Fax: [phone]  [facility]

## 2024-03-26 ENCOUNTER — ANCILLARY PROCEDURE (OUTPATIENT)
Dept: ULTRASOUND IMAGING | Facility: HOSPITAL | Age: 24
End: 2024-03-26
Attending: STUDENT IN AN ORGANIZED HEALTH CARE EDUCATION/TRAINING PROGRAM
Payer: MEDICAID

## 2024-03-26 ENCOUNTER — OFFICE VISIT (OUTPATIENT)
Dept: MATERNAL FETAL MEDICINE | Facility: HOSPITAL | Age: 24
End: 2024-03-26
Attending: STUDENT IN AN ORGANIZED HEALTH CARE EDUCATION/TRAINING PROGRAM
Payer: MEDICAID

## 2024-03-26 ENCOUNTER — TRANSFERRED RECORDS (OUTPATIENT)
Dept: HEALTH INFORMATION MANAGEMENT | Facility: CLINIC | Age: 24
End: 2024-03-26

## 2024-03-26 DIAGNOSIS — O09.899 HISTORY OF PRETERM DELIVERY, CURRENTLY PREGNANT: Primary | ICD-10-CM

## 2024-03-26 DIAGNOSIS — O09.899 HISTORY OF PRETERM DELIVERY, CURRENTLY PREGNANT: ICD-10-CM

## 2024-03-26 DIAGNOSIS — Z87.59 HISTORY OF IUFD: ICD-10-CM

## 2024-03-26 PROCEDURE — 76817 TRANSVAGINAL US OBSTETRIC: CPT | Mod: 26 | Performed by: STUDENT IN AN ORGANIZED HEALTH CARE EDUCATION/TRAINING PROGRAM

## 2024-03-26 PROCEDURE — 99207 PR NO CHARGE LOS: CPT | Performed by: STUDENT IN AN ORGANIZED HEALTH CARE EDUCATION/TRAINING PROGRAM

## 2024-03-26 PROCEDURE — 76817 TRANSVAGINAL US OBSTETRIC: CPT

## 2024-03-26 NOTE — PROGRESS NOTES
Please see the full imaging report from the ViewPoint program under the imaging tab.    Brenda Sharma MD  Maternal Fetal Medicine

## 2024-03-26 NOTE — NURSING NOTE
Gume King is a  at 16w2d who presents to [facility] for scheduled cervical length screening. SBAR given to Dr. YFN Sharma, see note in Epic.

## 2024-04-08 ENCOUNTER — PRENATAL OFFICE VISIT (OUTPATIENT)
Dept: FAMILY MEDICINE | Facility: CLINIC | Age: 24
End: 2024-04-08
Payer: COMMERCIAL

## 2024-04-08 VITALS
OXYGEN SATURATION: 99 % | RESPIRATION RATE: 16 BRPM | HEART RATE: 82 BPM | DIASTOLIC BLOOD PRESSURE: 67 MMHG | HEIGHT: 61 IN | BODY MASS INDEX: 27.64 KG/M2 | WEIGHT: 146.4 LBS | SYSTOLIC BLOOD PRESSURE: 103 MMHG | TEMPERATURE: 98.8 F

## 2024-04-08 DIAGNOSIS — O09.90 HIGH-RISK PREGNANCY, UNSPECIFIED TRIMESTER: Primary | ICD-10-CM

## 2024-04-08 DIAGNOSIS — Z87.51 HISTORY OF PRETERM DELIVERY: ICD-10-CM

## 2024-04-08 DIAGNOSIS — N94.89 UTERINE CRAMPING: ICD-10-CM

## 2024-04-08 LAB
ALBUMIN UR-MCNC: NEGATIVE MG/DL
APPEARANCE UR: CLEAR
BILIRUB UR QL STRIP: NEGATIVE
CLUE CELLS: ABNORMAL
COLOR UR AUTO: YELLOW
GLUCOSE UR STRIP-MCNC: NEGATIVE MG/DL
HGB UR QL STRIP: NEGATIVE
KETONES UR STRIP-MCNC: NEGATIVE MG/DL
LEUKOCYTE ESTERASE UR QL STRIP: NEGATIVE
NITRATE UR QL: NEGATIVE
PH UR STRIP: 7 [PH] (ref 5–7)
SP GR UR STRIP: 1.01 (ref 1–1.03)
TRICHOMONAS, WET PREP: ABNORMAL
UROBILINOGEN UR STRIP-ACNC: 0.2 E.U./DL
WBC'S/HIGH POWER FIELD, WET PREP: ABNORMAL
YEAST, WET PREP: ABNORMAL

## 2024-04-08 PROCEDURE — 81003 URINALYSIS AUTO W/O SCOPE: CPT | Performed by: FAMILY MEDICINE

## 2024-04-08 PROCEDURE — 87210 SMEAR WET MOUNT SALINE/INK: CPT | Performed by: FAMILY MEDICINE

## 2024-04-08 PROCEDURE — 87491 CHLMYD TRACH DNA AMP PROBE: CPT | Performed by: FAMILY MEDICINE

## 2024-04-08 PROCEDURE — 99207 PR COMPLICATED OB VISIT: CPT | Performed by: FAMILY MEDICINE

## 2024-04-08 PROCEDURE — 87591 N.GONORRHOEAE DNA AMP PROB: CPT | Performed by: FAMILY MEDICINE

## 2024-04-08 RX ORDER — PROGESTERONE 100 MG/1
100 CAPSULE ORAL DAILY
Qty: 90 CAPSULE | Refills: 3 | Status: SHIPPED | OUTPATIENT
Start: 2024-04-08 | End: 2024-05-08

## 2024-04-08 NOTE — PROGRESS NOTES
New OB Clinic Note - 2024   Visit was completed along with Kimberli .   SUBJECTIVE:  Derek Dwyer is a 24 year old  female @ 18w1d who is here for new OB visit.   LMP was consistent with Ultrasound at 7 weeks, gave EDC of 24.  Current Concerns:     Has some cramping every day, feels like a period cramp. Sometimes it hurts a lot where she has to sit down and rest for 20-30 minutes.  She has a history PPROM at 22 weeks with her first pregnancy.  AT that time, fetus had no HR on arrival to hospital and she had anhydramios. Dating was by bedside ultrasound at that time, as she had had no prenatal care prior to arrival at the hospital. Now, she is feeling mostly good with her mental health although somewhat anxious about whether this baby will be ok.    She has seen MFM and has been getting serial cervical length ultrasounds.  Cervix was of normal length so far.    Denies constipation. Having a normal, soft bowel movement daily.     OB History:  Patient is . Prior Pregnancies:  OB History    Para Term  AB Living   2 1 0 1 0 0   SAB IAB Ectopic Multiple Live Births   0 0 0 0 0      # Outcome Date GA Lbr Gregorio/2nd Weight Sex Delivery Anes PTL Lv   2 Current            1  19 22w3d  0.51 kg (1 lb 2 oz)  Vag-Spont None  FD      Birth Comments: stillbirth       Name: LESLEE DWYER-DEREK FD      Apgar1: 0  Apgar5: 0       Social Hx:   She has support from her family and father of baby Malena is involved.   Current Medications: prenatal vitamins  Past Medical History:   Diagnosis Date    Varicella      No past surgical history on file.  Family History   Problem Relation Age of Onset    Hypertension Mother     Diabetes Mother     Diabetes Maternal Grandmother     No Known Problems Brother     No Known Problems Brother     No Known Problems Brother     Stillborn Son      Current Outpatient Medications   Medication Sig Dispense Refill    Prenatal Vit-Fe Fumarate-FA (PRENATAL MULTIVITAMIN  "W/IRON) 27-0.8 MG tablet Take 1 tablet by mouth daily 90 tablet 3    doxylamine (UNISOM) 25 MG TABS tablet Take 0.5 tablets (12.5 mg) by mouth at bedtime (Patient not taking: Reported on 2024) 90 tablet 1    pyridOXINE (VITAMIN B6) 25 MG tablet Take 1 tablet (25 mg) by mouth 3 times daily (Patient not taking: Reported on 2024) 90 tablet 3     No current facility-administered medications for this visit.     ?  OBJECTIVE:  Vitals:    24 1248   BP: 103/67   BP Location: Right arm   Patient Position: Sitting   Cuff Size: Adult Regular   Pulse: 82   Resp: 16   Temp: 98.8  F (37.1  C)   TempSrc: Oral   SpO2: 99%   Weight: 66.4 kg (146 lb 6.4 oz)   Height: 1.549 m (5' 1\")     Gen: Awake, alert, in no acute distress  Abd: non-tender, non-distended. Bowel sounds present.   Pelvic Exam: Vagina and vulva are normal; no discharge is noted. Cervix normal without lesions. Uterus anteverted and mobile, normal in size and shape without tenderness. Adnexa normal in size without masses or tenderness.   Lower extremities: No edema  Neuro: No focal deficits   bpm  No results found for any visits on 24.  ASSESSMENT/PLAN:  Gume King is a 24 year old  at 18w1d weeks by early ultrasound. Estimated Date of Delivery: Sep 8, 2024.       Gume was seen today for prenatal care.    Diagnoses and all orders for this visit:    High-risk pregnancy, unspecified trimester: Checking urinalysis, wet prep and gonorrhea/chlamydia today.  Need to aggressively treat any vaginal infections or UTI.    -     UA Macroscopic with reflex to Microscopic and Culture; Future  -     UA Macroscopic with reflex to Microscopic and Culture    History of  delivery: Given this history and her current uterine cramping, will initiate vaginal progesterone since this is a low risk medication.  I do plan to call [facility] to verbally discuss this case with them.  She is seeing them tomorrow for repeat cervical length ultrasound. I offered " referral for counseling given her traumatic first delivery, but she declines and feels like she is doing okay right now.  States that she has good support from her partner.  -     progesterone (PROMETRIUM) 100 MG capsule; Place 1 capsule (100 mg) vaginally daily    Uterine cramping  -     Wet prep - Clinic Collect  -     Chlamydia & Gonorrhea by PCR, GICH/Range - Clinic Collect      ?  RTC in 4 weeks or sooner if problems.     Hermelinda Oliva MD    Options for treatment and follow-up care were reviewed with the patient and/or guardian. [place] Moo and/or guardian engaged in the decision making process and verbalized understanding of the options discussed and agreed with the final plan.

## 2024-04-08 NOTE — LETTER
April 8, 2024      Gume King  [address] [place] [address] N  [place] [facility] [address]        To Whom It May Concern:    Gume King  was seen on 04/08/2024 in my clinic.  Please excuse her and she may return to work 04/09/2024.        Sincerely,        Hermelinda Oliva MD

## 2024-04-09 ENCOUNTER — ANCILLARY PROCEDURE (OUTPATIENT)
Dept: ULTRASOUND IMAGING | Facility: HOSPITAL | Age: 24
End: 2024-04-09
Attending: OBSTETRICS & GYNECOLOGY
Payer: COMMERCIAL

## 2024-04-09 ENCOUNTER — OFFICE VISIT (OUTPATIENT)
Dept: MATERNAL FETAL MEDICINE | Facility: HOSPITAL | Age: 24
End: 2024-04-09
Attending: OBSTETRICS & GYNECOLOGY
Payer: COMMERCIAL

## 2024-04-09 DIAGNOSIS — O09.899 HISTORY OF PRETERM DELIVERY, CURRENTLY PREGNANT: Primary | ICD-10-CM

## 2024-04-09 DIAGNOSIS — O09.899 HISTORY OF PRETERM DELIVERY, CURRENTLY PREGNANT: ICD-10-CM

## 2024-04-09 DIAGNOSIS — Z36.2 ENCOUNTER FOR FOLLOW-UP ULTRASOUND OF FETAL ANATOMY: ICD-10-CM

## 2024-04-09 DIAGNOSIS — Z87.59 HISTORY OF IUFD: ICD-10-CM

## 2024-04-09 LAB
C TRACH DNA SPEC QL PROBE+SIG AMP: NEGATIVE
N GONORRHOEA DNA SPEC QL NAA+PROBE: NEGATIVE

## 2024-04-09 PROCEDURE — 76811 OB US DETAILED SNGL FETUS: CPT | Mod: 26 | Performed by: OBSTETRICS & GYNECOLOGY

## 2024-04-09 PROCEDURE — 99207 PR NO CHARGE LOS: CPT | Performed by: OBSTETRICS & GYNECOLOGY

## 2024-04-09 PROCEDURE — 76817 TRANSVAGINAL US OBSTETRIC: CPT

## 2024-04-09 PROCEDURE — 76817 TRANSVAGINAL US OBSTETRIC: CPT | Mod: 26 | Performed by: OBSTETRICS & GYNECOLOGY

## 2024-04-09 NOTE — PROGRESS NOTES
Please see the imaging tab for details of the ultrasound performed today.    Rin Church MD  Specialist in Maternal-Fetal Medicine

## 2024-04-09 NOTE — NURSING NOTE
[place] Fernando is a  at 18w2d who presents to [facility] for scheduled L2 and cervical length.  SBAR given to Dr. Church, see note in Epic. [place]  utilized for entirety of appointment.

## 2024-04-23 ENCOUNTER — OFFICE VISIT (OUTPATIENT)
Dept: MATERNAL FETAL MEDICINE | Facility: HOSPITAL | Age: 24
End: 2024-04-23
Attending: OBSTETRICS & GYNECOLOGY
Payer: COMMERCIAL

## 2024-04-23 ENCOUNTER — ANCILLARY PROCEDURE (OUTPATIENT)
Dept: ULTRASOUND IMAGING | Facility: HOSPITAL | Age: 24
End: 2024-04-23
Attending: OBSTETRICS & GYNECOLOGY
Payer: COMMERCIAL

## 2024-04-23 DIAGNOSIS — Z36.86 ENCOUNTER FOR ANTENATAL SCREENING FOR CERVICAL LENGTH: ICD-10-CM

## 2024-04-23 DIAGNOSIS — Z87.59 HISTORY OF IUFD: ICD-10-CM

## 2024-04-23 DIAGNOSIS — O09.899 HISTORY OF PRETERM DELIVERY, CURRENTLY PREGNANT: ICD-10-CM

## 2024-04-23 DIAGNOSIS — O09.899 HISTORY OF PRETERM DELIVERY, CURRENTLY PREGNANT: Primary | ICD-10-CM

## 2024-04-23 PROCEDURE — 99207 PR NO CHARGE LOS: CPT | Performed by: OBSTETRICS & GYNECOLOGY

## 2024-04-23 PROCEDURE — 76817 TRANSVAGINAL US OBSTETRIC: CPT | Mod: 26 | Performed by: OBSTETRICS & GYNECOLOGY

## 2024-04-23 PROCEDURE — 76817 TRANSVAGINAL US OBSTETRIC: CPT

## 2024-04-23 NOTE — NURSING NOTE
Gume King is a  at 20w2d who presents to [facility] for a transvaginal ultrasound.  Kimberli  utilized for today's visit.  Pt reports positive fetal movement. Pt denies bldg/lof/change in discharge, contractions, headache, vision changes, chest pain/SOB or edema. SBAR given to Dr. Lucero, see note in Epic.      Amara Sykes RN

## 2024-04-23 NOTE — PROGRESS NOTES
Please refer to ultrasound report under 'Imaging' Studies of 'Chart Review' tabs.    Juan Lucero M.D.

## 2024-04-29 ENCOUNTER — PRENATAL OFFICE VISIT (OUTPATIENT)
Dept: OBGYN | Facility: CLINIC | Age: 24
End: 2024-04-29
Payer: COMMERCIAL

## 2024-04-29 VITALS
OXYGEN SATURATION: 98 % | TEMPERATURE: 97.9 F | WEIGHT: 146.9 LBS | HEART RATE: 77 BPM | BODY MASS INDEX: 27.76 KG/M2 | SYSTOLIC BLOOD PRESSURE: 101 MMHG | DIASTOLIC BLOOD PRESSURE: 62 MMHG

## 2024-04-29 DIAGNOSIS — Z34.02 ENCOUNTER FOR SUPERVISION OF NORMAL FIRST PREGNANCY IN SECOND TRIMESTER: Primary | ICD-10-CM

## 2024-04-29 DIAGNOSIS — N90.7 LABIAL CYST: ICD-10-CM

## 2024-04-29 PROCEDURE — 99213 OFFICE O/P EST LOW 20 MIN: CPT

## 2024-04-29 NOTE — PROGRESS NOTES
CC: vaginal cyst, currently pregnant  S: Gume is a 25 yo  at 21.1 here today for a vaginal cyst  -initially established pnc here but tx care to [facility] [place] to deliver at [facility] in [place]  -pregnancy going well, seeing MFM for US for CL with hx of 22.3 wk IUFD PPROM and PTL  -denies ctx, cramping, bleeding lof, dysuria or constipation  -next US   -reports hx of left bartholin cyst needing I&D 5 x in 2018, reports it was recc to have marsupialization if it came back but now she is pregnant  -reports over the  5 days she feels like the cyst has returned and is causing her discomfort and increasing in size    O:/62   Pulse 77   Temp 97.9  F (36.6  C) (Oral)   Wt 66.6 kg (146 lb 14.4 oz)   LMP 2023 (Exact Date)   SpO2 98%   BMI 27.76 kg/m    Past Medical History:   Diagnosis Date    Varicella      No past surgical history on file.  Current Outpatient Medications   Medication Sig Dispense Refill    Prenatal Vit-Fe Fumarate-FA (PRENATAL MULTIVITAMIN W/IRON) 27-0.8 MG tablet Take 1 tablet by mouth daily 90 tablet 3    progesterone (PROMETRIUM) 100 MG capsule Place 1 capsule (100 mg) vaginally daily 90 capsule 3    doxylamine (UNISOM) 25 MG TABS tablet Take 0.5 tablets (12.5 mg) by mouth at bedtime (Patient not taking: Reported on 2024) 90 tablet 1    pyridOXINE (VITAMIN B6) 25 MG tablet Take 1 tablet (25 mg) by mouth 3 times daily (Patient not taking: Reported on 2024) 90 tablet 3     No current facility-administered medications for this visit.      No Known Allergies  Alert pleasant female NAD  RRR  Normal bp and pulse   Gravid abd    Pelvic Exam:  Vulva: No external lesions, normal hair distribution, no adenopathy  Vagina: Moist, pink, no abnormal discharge, well rugated, no lesions  Small palpable labial cyst on the left side between the labia majora and minora at appx 3 o'clock, appx pea sized it is not consistent with a batholin gland cyast      A/P:  1. Encounter for  supervision of normal first pregnancy in second trimester  -plans to continue care with Dr. Oliva at [facility] to deliver at [facility]   -discussed gct/hgb at 24 wks  -has next M apt scheduled  -is busy at work works two jobs, am and evening- discussed importance of routine pnc with hx     2. Labial cyst  -discussed the cyst is small, it is not a bartholin cyst, it is a labial cyst, if conservative measures do not reduce and the cyst  and it enlarges/causes pain we would recc calling and speaking with RN triage, they can view her chart and get her added to [facility] MD schedule for cyst removal as central scheduling will tell her there are no apts that meet her timeline. Gave instructions how to contact RN triage vs central scheduling line for more urgent apt.If not pregnant and enlarging again would recc marsupialization procedure under anesthesia for removal with MD PARRA    Given RN triage line instructed to call with enlargement of labial cyst, recc to see MD PARRA for labial cyst removal  -continue prenatal care with NITA Obrien CNP

## 2024-04-29 NOTE — LETTER
April 29, 2024      Gume King  [address] [place] [address] N  [place] [address]        To Whom It May Concern:    Gume King  was seen on 4/29/24.  Please excuse her  absence from work for doctors apt.         Sincerely,        NITA Dang CNP

## 2024-05-03 ENCOUNTER — MYC MEDICAL ADVICE (OUTPATIENT)
Dept: NURSING | Facility: CLINIC | Age: 24
End: 2024-05-03

## 2024-05-03 ENCOUNTER — OFFICE VISIT (OUTPATIENT)
Dept: OBGYN | Facility: CLINIC | Age: 24
End: 2024-05-03
Payer: COMMERCIAL

## 2024-05-03 VITALS
BODY MASS INDEX: 27.56 KG/M2 | TEMPERATURE: 97.6 F | HEART RATE: 100 BPM | OXYGEN SATURATION: 96 % | SYSTOLIC BLOOD PRESSURE: 113 MMHG | WEIGHT: 146 LBS | DIASTOLIC BLOOD PRESSURE: 75 MMHG | HEIGHT: 61 IN

## 2024-05-03 DIAGNOSIS — N76.4 LEFT GENITAL LABIAL ABSCESS: Primary | ICD-10-CM

## 2024-05-03 PROCEDURE — 56405 I&D VULVA/PERINEAL ABSCESS: CPT | Performed by: OBSTETRICS & GYNECOLOGY

## 2024-05-03 NOTE — LETTER
5/3/2024        RE: Gume Moo  [address] [place] [address] [place]  [facility] [address]      To Whom it May Concern,    Gume was seen in our office today for a short procedure at 10:00am.    Please feel free to contact me with any questions.  Thank you.      Sincerely,        Amara Barnard MD

## 2024-05-03 NOTE — TELEPHONE ENCOUNTER
----- Message from Amara Barnard MD sent at 5/3/2024  6:47 AM CDT -----  Regarding: correct scheduling?  This patient was added to my schedule overnight.  Based on appt note and note from Jazmyn earlier this week, I suspect the patient is assuming I am going to be removing a labial cyst.  That can't happen in the office and likely can;t happen with a labial inclusion cyst.  So, I just wanted to make sure the patient understands what the visit today would be, most likely I & D if possible, but will not be removed and no sedation available.  Thanks    AMARA BARNARD MD

## 2024-05-03 NOTE — PROGRESS NOTES
"S; Gume King is a 24 year old  who is 21w pregnant.  She receives her prenatal care with a group out of [facility].  She was seen earlier this week by Jazmyn Moran CNP for a labial cyst.  At the time it was minimally symptomatic and she opted not to do anything about it.  She has been hot packing and taking baths, but it has not improved.  It has continued to get bigger and more uncomfortable and she is now unable to really sit much at all.    She reports the baby has been moving and denies pregnancy concerns.    O: /75   Pulse 100   Temp 97.6  F (36.4  C)   Ht 1.549 m (5' 1\")   Wt 66.2 kg (146 lb)   LMP 2023 (Exact Date)   SpO2 96%   BMI 27.59 kg/m      Psych: normal affect, appropriate eye contact  Resp: no increased work of breathing  CV: no peripheral edema  Abd; gravid, SNT  after I&D performed.  Lymph: no enlarged inquinal nodes  Pelvic: large abscess in left labial crease, originating in labia minora at 2:00.  Edema of adjacent labia majora but no induration   Skin: no visible rashes or lesions.    Procedure:  After informed consent was obtained, the area was swabbed with lidocaine gel and betadine x 3.  5cc of 1% lidocaine was injected.  A stab incision was made into the abscess and a large amount of thin exudate was expressed.  The area was expressed until no further exudate was removed and abscess was significantly smaller.  Minimal bleeding was noted from the incision site. She tolerated the procedure well.    A/P: Labial abscess, drained.  21w pregnancy   Discussed home cares of continued warm compresses or baths to encouraged continued drainage of area.  Call or RTC if re-fills.  S/sx of infection reviewed.      CRISTOFER PATINO MD      "

## 2024-05-07 ENCOUNTER — OFFICE VISIT (OUTPATIENT)
Dept: MATERNAL FETAL MEDICINE | Facility: HOSPITAL | Age: 24
End: 2024-05-07
Attending: OBSTETRICS & GYNECOLOGY
Payer: COMMERCIAL

## 2024-05-07 ENCOUNTER — ANCILLARY PROCEDURE (OUTPATIENT)
Dept: ULTRASOUND IMAGING | Facility: HOSPITAL | Age: 24
End: 2024-05-07
Attending: OBSTETRICS & GYNECOLOGY
Payer: COMMERCIAL

## 2024-05-07 DIAGNOSIS — O09.892 H/O PRETERM DELIVERY, CURRENTLY PREGNANT, SECOND TRIMESTER: Primary | ICD-10-CM

## 2024-05-07 DIAGNOSIS — O09.899 HISTORY OF PRETERM DELIVERY, CURRENTLY PREGNANT: ICD-10-CM

## 2024-05-07 DIAGNOSIS — Z36.2 ENCOUNTER FOR FOLLOW-UP ULTRASOUND OF FETAL ANATOMY: ICD-10-CM

## 2024-05-07 PROCEDURE — 76816 OB US FOLLOW-UP PER FETUS: CPT

## 2024-05-07 PROCEDURE — 76816 OB US FOLLOW-UP PER FETUS: CPT | Mod: 26 | Performed by: OBSTETRICS & GYNECOLOGY

## 2024-05-07 PROCEDURE — 99207 PR NO CHARGE LOS: CPT | Performed by: OBSTETRICS & GYNECOLOGY

## 2024-05-07 PROCEDURE — 76817 TRANSVAGINAL US OBSTETRIC: CPT | Mod: 26 | Performed by: OBSTETRICS & GYNECOLOGY

## 2024-05-07 NOTE — NURSING NOTE
Gume King is a  at 22w2d who presents to [facility] for a follow-up ultrasound with transvaginal ultrasound. I Nabil Ag  utilized for today's appointment. Pt reports positive fetal movement. Pt denies bldg/lof/change in discharge, contractions, headache, vision changes, chest pain/SOB or edema. SBAR given to Dr. Betancur, see note in Epic.      Amara Sykes RN

## 2024-05-07 NOTE — PROGRESS NOTES
Please see full imaging report from ViewPoint program under imaging tab.    Keegan Betancur MD  Maternal Fetal Medicine

## 2024-05-08 ENCOUNTER — VIRTUAL VISIT (OUTPATIENT)
Dept: INTERPRETER SERVICES | Facility: CLINIC | Age: 24
End: 2024-05-08

## 2024-05-08 ENCOUNTER — PRENATAL OFFICE VISIT (OUTPATIENT)
Dept: FAMILY MEDICINE | Facility: CLINIC | Age: 24
End: 2024-05-08
Payer: COMMERCIAL

## 2024-05-08 VITALS
HEART RATE: 73 BPM | SYSTOLIC BLOOD PRESSURE: 109 MMHG | OXYGEN SATURATION: 98 % | WEIGHT: 149.08 LBS | HEIGHT: 61 IN | TEMPERATURE: 97.8 F | BODY MASS INDEX: 28.15 KG/M2 | DIASTOLIC BLOOD PRESSURE: 74 MMHG | RESPIRATION RATE: 18 BRPM

## 2024-05-08 DIAGNOSIS — Z34.92 ENCOUNTER FOR SUPERVISION OF NORMAL PREGNANCY IN SECOND TRIMESTER, UNSPECIFIED GRAVIDITY: Primary | ICD-10-CM

## 2024-05-08 DIAGNOSIS — N76.4 LEFT GENITAL LABIAL ABSCESS: ICD-10-CM

## 2024-05-08 DIAGNOSIS — Z37.1 DELIVERY OUTCOME OF SINGLE STILLBORN INFANT: ICD-10-CM

## 2024-05-08 PROCEDURE — 99207 PR PRENATAL VISIT: CPT | Performed by: FAMILY MEDICINE

## 2024-05-08 NOTE — PROGRESS NOTES
"SUBJECTIVE:  Gume King  at 22w3d. Estimated Date of Delivery: Sep 8, 2024.  She is doing well. She does not have nausea and vomiting. She denies abdominal pain/cramping, vaginal bleeding, leakage of fluid. Fetal movement is present.   Concerns: recently had labial abscess drained (on 5/3) at [facility] office.     Gume King states she has had infections of the same area 7 times in the past year or so. Per chart review, she did have recurrent Bartholin's cysts but this most recent abscess was a labial abscess. She is wondering if there is anything she can do to keep these from returning.     Per chart review, [facility]    [facility], ER visit for Bartholin cyst drainage, reported to be \"3 cm left labial Bartholin's gland cyst.\"  23: ER visit for left-sided area of swelling and tenderness. No I&D was done but Bactrim was given and on 23, the area was drained at [place] by Dr. Nieves.   10/3/ 2023: I&D of same area at [facility].     2023. I&D of same area at [facility]    5/3/24: I&D of left labial cyst at 2:00 at [facility].         ROS  Negative except as noted above in HPI.  OBJECTIVE:  Blood pressure 109/74, pulse 73, temperature 97.8  F (36.6  C), temperature source Oral, resp. rate 18, height 1.549 m (5' 1\"), weight 67.6 kg (149 lb 1.3 oz), last menstrual period 2023, SpO2 98%.  Gen: comfortable, no acute distress.  See OB Vitals flowsheet.  ASSESSMENT/PLAN:  Gume was seen today for prenatal care.    Diagnoses and all orders for this visit:    Encounter for supervision of normal pregnancy in second trimester, unspecified     Delivery outcome of single stillborn infant: history of this. Had serial cervical length testing with MFM and is now done, since she is 22 weeks without a short cervix.     Recurrent Bartholin's cysts: somewhat confusing picture, as last infection was of a labial cyst, " not Bartholin's, though she did have recurrent infected Bartholin's cysts previously. She declined exam today because there was no swelling or pain now.  She wants to wait to see if an infection recurs again and if so, would examine and consider whether she needs Bartholin's cyst OR labial cyst full removal rather than only I&D.         Visit completed along with assistance of Kimberli .    Hermelinda Oliva MD    The longitudinal plan of care for the diagnosis(es)/condition(s) as documented were addressed during this visit. Due to the added complexity in care, I will continue to support Ceres in the subsequent management and with ongoing continuity of care.

## 2024-06-12 ENCOUNTER — VIRTUAL VISIT (OUTPATIENT)
Dept: INTERPRETER SERVICES | Facility: CLINIC | Age: 24
End: 2024-06-12

## 2024-06-12 ENCOUNTER — PRENATAL OFFICE VISIT (OUTPATIENT)
Dept: FAMILY MEDICINE | Facility: CLINIC | Age: 24
End: 2024-06-12
Payer: COMMERCIAL

## 2024-06-12 VITALS
DIASTOLIC BLOOD PRESSURE: 68 MMHG | WEIGHT: 156 LBS | HEIGHT: 61 IN | BODY MASS INDEX: 29.45 KG/M2 | RESPIRATION RATE: 16 BRPM | TEMPERATURE: 98 F | SYSTOLIC BLOOD PRESSURE: 102 MMHG | OXYGEN SATURATION: 98 % | HEART RATE: 87 BPM

## 2024-06-12 DIAGNOSIS — J02.9 SORE THROAT: ICD-10-CM

## 2024-06-12 DIAGNOSIS — Z34.92 ENCOUNTER FOR SUPERVISION OF NORMAL PREGNANCY IN SECOND TRIMESTER, UNSPECIFIED GRAVIDITY: Primary | ICD-10-CM

## 2024-06-12 LAB
DEPRECATED S PYO AG THROAT QL EIA: NEGATIVE
GLUCOSE 1H P 50 G GLC PO SERPL-MCNC: 90 MG/DL (ref 70–129)
GROUP A STREP BY PCR: DETECTED
HGB BLD-MCNC: 11 G/DL (ref 11.7–15.7)

## 2024-06-12 PROCEDURE — 86780 TREPONEMA PALLIDUM: CPT | Performed by: FAMILY MEDICINE

## 2024-06-12 PROCEDURE — 85018 HEMOGLOBIN: CPT | Performed by: FAMILY MEDICINE

## 2024-06-12 PROCEDURE — 82950 GLUCOSE TEST: CPT | Performed by: FAMILY MEDICINE

## 2024-06-12 PROCEDURE — 36415 COLL VENOUS BLD VENIPUNCTURE: CPT | Performed by: FAMILY MEDICINE

## 2024-06-12 PROCEDURE — 99207 PR PRENATAL VISIT: CPT | Performed by: FAMILY MEDICINE

## 2024-06-12 PROCEDURE — 87651 STREP A DNA AMP PROBE: CPT | Performed by: FAMILY MEDICINE

## 2024-06-12 PROCEDURE — T1013 SIGN LANG/ORAL INTERPRETER: HCPCS | Mod: U4

## 2024-06-12 NOTE — PROGRESS NOTES
"SUBJECTIVE:  Gume King  at 27w3d by early ultrasound. Estimated Date of Delivery: Sep 8, 2024.  She is doing well. She does not have nausea and vomiting. She denies abdominal pain/cramping, vaginal bleeding, leakage of fluid. Fetal movement is present.   Concerns: none  ROS  Negative except as noted above in HPI.  OBJECTIVE:  Blood pressure 102/68, pulse 87, temperature 98  F (36.7  C), temperature source Oral, resp. rate 16, height 1.549 m (5' 1\"), weight 70.8 kg (156 lb), last menstrual period 2023, SpO2 98%.  Gen: comfortable, no acute distress.  See OB Vitals flowsheet.  ASSESSMENT/PLAN:  Gume was seen today for prenatal care.    Diagnoses and all orders for this visit:    Encounter for supervision of normal pregnancy in second trimester, unspecified   -     Glucose tolerance, gest screen, 1 hour; Future  -     Treponema Abs w Reflex to RPR and Titer; Future  -     Hemoglobin; Future      -IUP at 27w3d:   Prenatal labs reviewed and normal.   Cell-free DNA testing done, results came back normal.    Fetal survey was done at 20 weeks and was normal.   GTT with a score of 90.   Breast/Bottle: discuss next time  RTC in 4 weeks or sooner with problems.    Visit completed along with assistance of Kimberli .    Hermelinda Oliva MD    "

## 2024-06-13 LAB — T PALLIDUM AB SER QL: NONREACTIVE

## 2024-06-14 ENCOUNTER — TELEPHONE (OUTPATIENT)
Dept: FAMILY MEDICINE | Facility: CLINIC | Age: 24
End: 2024-06-14
Payer: COMMERCIAL

## 2024-06-14 DIAGNOSIS — J02.0 STREPTOCOCCAL SORE THROAT: Primary | ICD-10-CM

## 2024-06-14 RX ORDER — PENICILLIN V POTASSIUM 500 MG/1
500 TABLET, FILM COATED ORAL 2 TIMES DAILY
Qty: 20 TABLET | Refills: 0 | Status: SHIPPED | OUTPATIENT
Start: 2024-06-14 | End: 2024-06-24

## 2024-06-14 NOTE — TELEPHONE ENCOUNTER
Called pt because her group a strep PCR was POSITIVE. Offered oral meds or penicillin shot. Will send oral penicillin to [facility].    Hermelinda Oliva MD

## 2024-06-24 ENCOUNTER — TELEPHONE (OUTPATIENT)
Dept: FAMILY MEDICINE | Facility: CLINIC | Age: 24
End: 2024-06-24
Payer: COMMERCIAL

## 2024-06-24 NOTE — TELEPHONE ENCOUNTER
Forms/Letter Request    Type of form/letter: FMLA - Unknown      Do we have the form/letter: Yes:     Who is the form from? Patient    Where did/will the form come from? form was faxed in    When is form/letter needed by: asap    How would you like the form/letter returned: Fax : [phone]    Patient Notified form requests are processed in 5-7 business days:Yes    Could we send this information to you in [employer] or would you prefer to receive a phone call?:   Patient would prefer a phone call   Okay to leave a detailed message?: Yes at Home number on file [phone] (home)

## 2024-06-26 PROBLEM — O09.90 HIGH-RISK PREGNANCY, UNSPECIFIED TRIMESTER: Status: ACTIVE | Noted: 2019-07-09

## 2024-07-10 ENCOUNTER — PRENATAL OFFICE VISIT (OUTPATIENT)
Dept: FAMILY MEDICINE | Facility: CLINIC | Age: 24
End: 2024-07-10
Payer: COMMERCIAL

## 2024-07-10 VITALS
DIASTOLIC BLOOD PRESSURE: 70 MMHG | OXYGEN SATURATION: 98 % | HEIGHT: 61 IN | SYSTOLIC BLOOD PRESSURE: 107 MMHG | HEART RATE: 79 BPM | WEIGHT: 161.25 LBS | RESPIRATION RATE: 16 BRPM | BODY MASS INDEX: 30.44 KG/M2 | TEMPERATURE: 98.9 F

## 2024-07-10 DIAGNOSIS — O09.90 HIGH-RISK PREGNANCY, UNSPECIFIED TRIMESTER: Primary | ICD-10-CM

## 2024-07-10 DIAGNOSIS — O47.00 PRETERM CONTRACTIONS: ICD-10-CM

## 2024-07-10 DIAGNOSIS — O26.843 FUNDAL HEIGHT LOW FOR DATES IN THIRD TRIMESTER: ICD-10-CM

## 2024-07-10 PROCEDURE — 99207 PR PRENATAL VISIT: CPT | Performed by: FAMILY MEDICINE

## 2024-07-10 NOTE — PROGRESS NOTES
"SUBJECTIVE:  Gume King  at 31w3d by early ultrasound. Estimated Date of Delivery: Sep 8, 2024.  She is doing well. She denies vaginal bleeding, leakage of fluid, or urinary symptoms. Fetal movement is present.     She is having some mild contractions when working. Works at [employer]. Her stomach gets tight. It might last off and on for about an hour and then get better. Seems to go away if she rests.        ROS  Negative except as noted above in HPI  OBJECTIVE:  Blood pressure 107/70, pulse 79, temperature 98.9  F (37.2  C), temperature source Oral, resp. rate 16, height 1.549 m (5' 1\"), weight 73.1 kg (161 lb 4 oz), last menstrual period 2023, SpO2 98%.  Gen: Comfortable, no acute distress.  Abd: Gravid, non-tender  See OB Vitals flowsheet.  ASSESSMENT/PLAN:  Gume was seen today for prenatal care and back pain.    Diagnoses and all orders for this visit:    High-risk pregnancy, unspecified trimester     contractions: discussed resting and drinking water if this happens, go to hospital if acutely worsening. Will check cervical length along with growth ultrasound  -     US OB > 14 Weeks; Future    Fundal height low for dates in third trimester: measuring 1 cm behind but EFW on fetal survey was in 23rd %ile, recheck today.   -     US OB > 14 Weeks; Future      -IUP at 31w3d:   Prenatal labs reviewed and normal.   Cell-free DNA testing done; results came back normal.    Fetal survey was done at 20 weeks and was normal.   Peripartum Anesthesia: the patient does desire an epidural during labor.    Breast/Bottle: Breast   Reviewed plans for getting to the hospital.  RTC in 2 weeks or sooner with problems.    Hermelinda Oliva MD    "

## 2024-07-12 ENCOUNTER — MYC MEDICAL ADVICE (OUTPATIENT)
Dept: FAMILY MEDICINE | Facility: CLINIC | Age: 24
End: 2024-07-12
Payer: COMMERCIAL

## 2024-07-15 ENCOUNTER — OFFICE VISIT (OUTPATIENT)
Dept: FAMILY MEDICINE | Facility: CLINIC | Age: 24
End: 2024-07-15
Payer: COMMERCIAL

## 2024-07-15 ENCOUNTER — NURSE TRIAGE (OUTPATIENT)
Dept: FAMILY MEDICINE | Facility: CLINIC | Age: 24
End: 2024-07-15
Payer: COMMERCIAL

## 2024-07-15 VITALS
RESPIRATION RATE: 18 BRPM | SYSTOLIC BLOOD PRESSURE: 105 MMHG | BODY MASS INDEX: 30.16 KG/M2 | HEART RATE: 74 BPM | DIASTOLIC BLOOD PRESSURE: 70 MMHG | WEIGHT: 159.6 LBS | OXYGEN SATURATION: 97 % | TEMPERATURE: 98.2 F

## 2024-07-15 DIAGNOSIS — N30.00 ACUTE CYSTITIS WITHOUT HEMATURIA: Primary | ICD-10-CM

## 2024-07-15 DIAGNOSIS — O09.90 HIGH-RISK PREGNANCY, UNSPECIFIED TRIMESTER: ICD-10-CM

## 2024-07-15 LAB
ALBUMIN UR-MCNC: NEGATIVE MG/DL
APPEARANCE UR: CLEAR
BACTERIA #/AREA URNS HPF: ABNORMAL /HPF
BILIRUB UR QL STRIP: NEGATIVE
CLUE CELLS: ABNORMAL
COLOR UR AUTO: YELLOW
GLUCOSE UR STRIP-MCNC: NEGATIVE MG/DL
HGB UR QL STRIP: NEGATIVE
KETONES UR STRIP-MCNC: NEGATIVE MG/DL
LEUKOCYTE ESTERASE UR QL STRIP: ABNORMAL
NITRATE UR QL: NEGATIVE
PH UR STRIP: 6.5 [PH] (ref 5–8)
RBC #/AREA URNS AUTO: ABNORMAL /HPF
SP GR UR STRIP: 1.02 (ref 1–1.03)
SQUAMOUS #/AREA URNS AUTO: ABNORMAL /LPF
TRICHOMONAS, WET PREP: ABNORMAL
UROBILINOGEN UR STRIP-ACNC: 0.2 E.U./DL
WBC #/AREA URNS AUTO: ABNORMAL /HPF
WBC CLUMPS #/AREA URNS HPF: PRESENT /HPF
WBC'S/HIGH POWER FIELD, WET PREP: ABNORMAL
YEAST, WET PREP: ABNORMAL

## 2024-07-15 PROCEDURE — 81001 URINALYSIS AUTO W/SCOPE: CPT

## 2024-07-15 PROCEDURE — 87086 URINE CULTURE/COLONY COUNT: CPT

## 2024-07-15 PROCEDURE — 87210 SMEAR WET MOUNT SALINE/INK: CPT

## 2024-07-15 PROCEDURE — 99214 OFFICE O/P EST MOD 30 MIN: CPT

## 2024-07-15 RX ORDER — CEPHALEXIN 500 MG/1
500 CAPSULE ORAL 4 TIMES DAILY
Qty: 20 CAPSULE | Refills: 0 | Status: SHIPPED | OUTPATIENT
Start: 2024-07-15 | End: 2024-07-20

## 2024-07-15 NOTE — PROGRESS NOTES
Assessment & Plan     Acute cystitis without hematuria  High-risk pregnancy, unspecified trimester  24-year-old at 32 weeks gestation presenting for dysuria.  No systemic symptoms concerning for pyelonephritis.  Wet prep was unremarkable, UA revealed just trace leukocyte Estrace but given symptoms will culture and treat.  Will follow-up with primary care provider.  - Wet prep - Clinic Collect  - UA Macroscopic with reflex to Microscopic and Culture; Future  - UA Macroscopic with reflex to Microscopic and Culture  - UA Microscopic with Reflex to Culture  - Urine Culture Aerobic Bacterial; Future  - cephALEXin (KEFLEX) 500 MG capsule; Take 1 capsule (500 mg) by mouth 4 times daily for 5 days    Return if symptoms worsen or fail to improve.    Subjective   Gume is a 24 year old, presenting for the following health issues:  Vaginal Problem (1 month Vaginal discharge and burning when urinating.)    HPI     Vaginal Symptoms  Onset/Duration: 1 month  Description:  Vaginal Discharge: none   Itching (Pruritis): No  Burning sensation:  YES- when urinating  Odor: No  Accompanying Signs & Symptoms:  Urinary symptoms: YES- dysuria  Abdominal pain: No  Fever: No  History:   Sexually active: YES  New Partner: No  Possibility of Pregnancy:  YES- 32 weeks pregnant  Recent antibiotic use: No  Previous vaginitis issues: No  Precipitating or alleviating factors: None  Therapies tried and outcome: none    Review of Systems  Constitutional, HEENT, cardiovascular, pulmonary, gi and gu systems are negative, except as otherwise noted.      Objective    /70   Pulse 74   Temp 98.2  F (36.8  C)   Resp 18   Wt 72.4 kg (159 lb 9.6 oz)   LMP 12/03/2023 (Exact Date)   SpO2 97%   BMI 30.16 kg/m    Body mass index is 30.16 kg/m .  Physical Exam   GENERAL: alert and no distress  NECK: no adenopathy, no asymmetry, masses, or scars  RESP: lungs clear to auscultation - no rales, rhonchi or wheezes  CV: regular rate and rhythm, normal S1  S2, no S3 or S4, no murmur, click or rub, no peripheral edema  ABDOMEN: gravid, mildly tender suprapubic region, no hepatosplenomegaly, no masses and bowel sounds normal  MS: no gross musculoskeletal defects noted, no edema    Results for orders placed or performed in visit on 07/15/24 (from the past 24 hour(s))   Wet prep - Clinic Collect    Specimen: Vagina; Swab   Result Value Ref Range    Trichomonas Absent Absent    Yeast Absent Absent    Clue Cells Absent Absent    WBCs/high power field 2+ (A) None   UA Macroscopic with reflex to Microscopic and Culture    Specimen: Urine, Clean Catch   Result Value Ref Range    Color Urine Yellow Colorless, Straw, Light Yellow, Yellow    Appearance Urine Clear Clear    Glucose Urine Negative Negative mg/dL    Bilirubin Urine Negative Negative    Ketones Urine Negative Negative mg/dL    Specific Gravity Urine 1.025 1.005 - 1.030    Blood Urine Negative Negative    pH Urine 6.5 5.0 - 8.0    Protein Albumin Urine Negative Negative mg/dL    Urobilinogen Urine 0.2 0.2, 1.0 E.U./dL    Nitrite Urine Negative Negative    Leukocyte Esterase Urine Trace (A) Negative   UA Microscopic with Reflex to Culture   Result Value Ref Range    Bacteria Urine None Seen None Seen /HPF    RBC Urine 0-2 0-2 /HPF /HPF    WBC Urine 5-10 (A) 0-5 /HPF /HPF    Squamous Epithelials Urine Many (A) None Seen /LPF    WBC Clumps Urine Present (A) None Seen /HPF    Narrative    Urine Culture not indicated           Signed Electronically by: Rojelio Rivera DO

## 2024-07-15 NOTE — TELEPHONE ENCOUNTER
"Nurse Triage SBAR    Is this a 2nd Level Triage? NO    Situation: Patient contact clinic via [employer] message about \"yellow discharge\"    Background: Patient currently pregnant, is 32w1d with an DAPHNIE of 24. Patient says been having this discharge for over a month now, she had forgotten to let her pcp know during her last visit here. Hx of 22w3d IUFD in 2019. Recurrent infected bartholin cysts in  and a labial cyst in 2024.     Assessment: Greenish-yellow discharge, no odor. Discharge comes every 2-3 days. No rash, no abdominal pain, no fever. Does have some burning sensation intermittently which occurs around lower abdominal area and bladder area.     Protocol Recommended Disposition: See in Office Today     Recommendation: RN recommends patient be seen in office today. No available open appts today with any provider at [facility]. RN recommends patient go to [facility]. Patient agrees.        Does the patient meet one of the following criteria for ADS visit consideration? 16+ years old, with an [facility] PCP     TIP  Providers, please consider if this condition is appropriate for management at one of our Acute and Diagnostic Services sites.     If patient is a good candidate, please use dotphrase <dot>triageresponse and select Refer to ADS to document.    Reason for Disposition   Patient wants to be seen    Additional Information   Negative: Pregnant 20 or more weeks with vaginal bleeding   Negative: Pregnant < 20 weeks with vaginal bleeding   Negative: Pregnant < 37 weeks (i.e., ) and having contractions or other symptoms of labor   Negative: Pregnant 37 or more weeks (i.e., term) and having contractions or other symptoms of labor   Negative: Leakage of fluid (trickle, gush) from the vagina   Negative: Pregnant 23 or more weeks and baby is moving less today (e.g., kick count < 5 in 1 hour or < 10 in 2 hours)   Negative: Pregnant 24-36 weeks () and pinkish or brownish mucous discharge   Negative: Constant " "abdominal pain and present > 2 hours   Negative: Intermittent lower abdominal pain lasting > 24 hours   Negative: Patient sounds very sick or weak to the triager   Negative: Yellow or green vaginal discharge and fever   Negative: Genital area looks infected (e.g.,  draining sore, spreading redness)   Negative: Painful rash with tiny water blisters   Negative: Rash (e.g., redness, tiny bumps, sore) of genital area    Answer Assessment - Initial Assessment Questions  1. DISCHARGE: \"Describe the discharge.\" (e.g., white, yellow, green, gray, foamy, cottage cheese-like)      Greenish-yellow discharge   2. ODOR: \"Is there a bad odor?\"      No   3. ONSET: \"When did the discharge begin?\"      More than a month ago, when she came into the clinic last time she had forgotten to mention it to provider. Discharge comes every 2-3 days.   4. RASH: \"Is there a rash in that area?\" If Yes, ask: \"Describe it.\" (e.g., redness, blisters, sores, bumps)      No   5. ABDOMEN PAIN: \"Are you having any abdomen pain?\" If Yes, ask: \"What does it feel like?\" (e.g., crampy, dull, intermittent, constant)       No   6. ABDOMEN PAIN SEVERITY: If present, ask: \"How bad is it?\"  (e.g., Scale 1-10; mild, moderate, or severe)    - MILD (1-3): doesn't interfere with normal activities, abdomen soft and not tender to touch     - MODERATE (4-7): interferes with normal activities or awakens from sleep, abdomen tender to touch     - SEVERE (8-10): excruciating pain, doubled over, unable to do any normal activities      No   7. CAUSE: \"What do you think is causing the discharge?\"      Unsure   8. OTHER SYMPTOMS: \"Do you have any other symptoms?\" (e.g., fever, itching, vaginal bleeding, pain with urination)      Burning sensation intermittent, occurs in abdominal area and bladder area   9. DAPHNIE: \"What date are you expecting to deliver?\"       9/8/24  10. PREGNANCY: \"How many weeks pregnant are you?\"        32w1d    Protocols used: Pregnancy - Vaginal " [id number]-SINILSON Sainz, MSN, RN   [facility]

## 2024-07-16 LAB — BACTERIA UR CULT: NORMAL

## 2024-07-21 ENCOUNTER — HEALTH MAINTENANCE LETTER (OUTPATIENT)
Age: 24
End: 2024-07-21

## 2024-07-23 ENCOUNTER — HOSPITAL ENCOUNTER (OUTPATIENT)
Dept: ULTRASOUND IMAGING | Facility: HOSPITAL | Age: 24
Discharge: HOME | End: 2024-07-23
Attending: FAMILY MEDICINE | Admitting: FAMILY MEDICINE
Payer: COMMERCIAL

## 2024-07-23 DIAGNOSIS — O26.843 FUNDAL HEIGHT LOW FOR DATES IN THIRD TRIMESTER: ICD-10-CM

## 2024-07-23 DIAGNOSIS — O47.00 PRETERM CONTRACTIONS: ICD-10-CM

## 2024-07-23 PROCEDURE — 76816 OB US FOLLOW-UP PER FETUS: CPT

## 2024-07-31 ENCOUNTER — PRENATAL OFFICE VISIT (OUTPATIENT)
Dept: FAMILY MEDICINE | Facility: CLINIC | Age: 24
End: 2024-07-31
Payer: COMMERCIAL

## 2024-07-31 VITALS
WEIGHT: 160.5 LBS | HEIGHT: 61 IN | SYSTOLIC BLOOD PRESSURE: 98 MMHG | HEART RATE: 98 BPM | TEMPERATURE: 98.2 F | DIASTOLIC BLOOD PRESSURE: 66 MMHG | RESPIRATION RATE: 18 BRPM | OXYGEN SATURATION: 96 % | BODY MASS INDEX: 30.3 KG/M2

## 2024-07-31 DIAGNOSIS — O09.90 HIGH-RISK PREGNANCY, UNSPECIFIED TRIMESTER: Primary | ICD-10-CM

## 2024-07-31 PROCEDURE — 99207 PR COMPLICATED OB VISIT: CPT | Performed by: FAMILY MEDICINE

## 2024-07-31 PROCEDURE — T1013 SIGN LANG/ORAL INTERPRETER: HCPCS | Mod: U4

## 2024-07-31 NOTE — PROGRESS NOTES
"SUBJECTIVE:  Gume King  at 34w3d by early ultrasound. Estimated Date of Delivery: Sep 8, 2024.  She is doing well. She denies vaginal bleeding, leakage of fluid, or urinary symptoms. Fetal movement is present. She is not having contractions.  Concerns: wondering how quickly she can get pregnant after having her baby.  She had a Nexplanon in the past and did not like it because she had her bleeding every day. Also used the Depo shot and had bleeding every day.     ROS  Negative except as noted above in HPI  OBJECTIVE:  Blood pressure 98/66, pulse 98, temperature 98.2  F (36.8  C), temperature source Oral, resp. rate 18, height 1.549 m (5' 1\"), weight 72.8 kg (160 lb 8 oz), last menstrual period 2023, SpO2 96%.  Gen: Comfortable, no acute distress.  Abd: Gravid, non-tender  See OB Vitals flowsheet.  ASSESSMENT/PLAN:  Gume was seen today for prenatal care.    Diagnoses and all orders for this visit:    High-risk pregnancy, unspecified trimester      -IUP at 34w3d:   Prenatal labs reviewed and normal.   Cell-free DNA testing done; results came back normal.    Fetal survey was done at 20 weeks and was normal.    Peripartum Anesthesia: the patient does desire an epidural during labor.   Post-partum Contraception: Parguard IUD. Discussed multiple methods today, most interested in this.   Breast/Bottle: Breast   Reviewed plans for getting to the hospital.  RTC in 2 weeks or sooner with problems.    Visit completed along with assistance of Kimberli .  Hermelinda Oliva MD    "

## 2024-08-15 ENCOUNTER — TELEPHONE (OUTPATIENT)
Dept: FAMILY MEDICINE | Facility: CLINIC | Age: 24
End: 2024-08-15
Payer: COMMERCIAL

## 2024-08-15 NOTE — TELEPHONE ENCOUNTER
Forms/Letter Request    Type of form/letter: OTHER: short term disability      Do we have the form/letter: Yes: faxed    Who is the form from? Patient's employer [employer]  (if other please explain)    Where did/will the form come from? form was faxed in 08/14/2024    When is form/letter needed by: ASAP    How would you like the form/letter returned: Fax : [phone]    Patient Notified form requests are processed in 5-7 business days:No incoming fax     Could we send this information to you in [employer] or would you prefer to receive a phone call?:   Patient would prefer a phone call     CHAIM: no CHAIM on file, appt message entered for 08/21 appointment for fd to get CHAIM.     Okay to leave a detailed message?: Yes at Cell number on file:    Telephone Information:   Mobile [phone]

## 2024-08-16 ENCOUNTER — HOSPITAL ENCOUNTER (INPATIENT)
Facility: HOSPITAL | Age: 24
LOS: 2 days | Discharge: HOME | End: 2024-08-18
Attending: FAMILY MEDICINE | Admitting: FAMILY MEDICINE
Payer: COMMERCIAL

## 2024-08-16 PROBLEM — O47.00 PRETERM UTERINE CONTRACTIONS: Status: ACTIVE | Noted: 2024-08-16

## 2024-08-16 LAB
ABO/RH(D): NORMAL
ANTIBODY SCREEN: NEGATIVE
HGB BLD-MCNC: 12.2 G/DL (ref 11.7–15.7)
SPECIMEN EXPIRATION DATE: NORMAL
T PALLIDUM AB SER QL: NONREACTIVE

## 2024-08-16 PROCEDURE — 722N000001 HC LABOR CARE VAGINAL DELIVERY SINGLE

## 2024-08-16 PROCEDURE — 0UQMXZZ REPAIR VULVA, EXTERNAL APPROACH: ICD-10-PCS | Performed by: FAMILY MEDICINE

## 2024-08-16 PROCEDURE — 250N000011 HC RX IP 250 OP 636: Performed by: FAMILY MEDICINE

## 2024-08-16 PROCEDURE — 250N000013 HC RX MED GY IP 250 OP 250 PS 637: Performed by: FAMILY MEDICINE

## 2024-08-16 PROCEDURE — 0KQM0ZZ REPAIR PERINEUM MUSCLE, OPEN APPROACH: ICD-10-PCS | Performed by: FAMILY MEDICINE

## 2024-08-16 PROCEDURE — 10907ZC DRAINAGE OF AMNIOTIC FLUID, THERAPEUTIC FROM PRODUCTS OF CONCEPTION, VIA NATURAL OR ARTIFICIAL OPENING: ICD-10-PCS | Performed by: FAMILY MEDICINE

## 2024-08-16 PROCEDURE — 87653 STREP B DNA AMP PROBE: CPT | Performed by: FAMILY MEDICINE

## 2024-08-16 PROCEDURE — 36415 COLL VENOUS BLD VENIPUNCTURE: CPT | Performed by: FAMILY MEDICINE

## 2024-08-16 PROCEDURE — 86900 BLOOD TYPING SEROLOGIC ABO: CPT | Performed by: FAMILY MEDICINE

## 2024-08-16 PROCEDURE — 59400 OBSTETRICAL CARE: CPT | Performed by: FAMILY MEDICINE

## 2024-08-16 PROCEDURE — 85018 HEMOGLOBIN: CPT | Performed by: FAMILY MEDICINE

## 2024-08-16 PROCEDURE — 86780 TREPONEMA PALLIDUM: CPT | Performed by: FAMILY MEDICINE

## 2024-08-16 PROCEDURE — 250N000009 HC RX 250: Performed by: FAMILY MEDICINE

## 2024-08-16 PROCEDURE — 120N000001 HC R&B MED SURG/OB

## 2024-08-16 RX ORDER — METHYLERGONOVINE MALEATE 0.2 MG/ML
200 INJECTION INTRAVENOUS
Status: DISCONTINUED | OUTPATIENT
Start: 2024-08-16 | End: 2024-08-18 | Stop reason: HOSPADM

## 2024-08-16 RX ORDER — OXYTOCIN 10 [USP'U]/ML
10 INJECTION, SOLUTION INTRAMUSCULAR; INTRAVENOUS
Status: DISCONTINUED | OUTPATIENT
Start: 2024-08-16 | End: 2024-08-18 | Stop reason: HOSPADM

## 2024-08-16 RX ORDER — LOPERAMIDE HCL 2 MG
2 CAPSULE ORAL
Status: DISCONTINUED | OUTPATIENT
Start: 2024-08-16 | End: 2024-08-18 | Stop reason: HOSPADM

## 2024-08-16 RX ORDER — CARBOPROST TROMETHAMINE 250 UG/ML
250 INJECTION, SOLUTION INTRAMUSCULAR
Status: DISCONTINUED | OUTPATIENT
Start: 2024-08-16 | End: 2024-08-16 | Stop reason: HOSPADM

## 2024-08-16 RX ORDER — TRANEXAMIC ACID 10 MG/ML
1 INJECTION, SOLUTION INTRAVENOUS EVERY 30 MIN PRN
Status: DISCONTINUED | OUTPATIENT
Start: 2024-08-16 | End: 2024-08-18 | Stop reason: HOSPADM

## 2024-08-16 RX ORDER — MISOPROSTOL 200 UG/1
800 TABLET ORAL
Status: DISCONTINUED | OUTPATIENT
Start: 2024-08-16 | End: 2024-08-16 | Stop reason: HOSPADM

## 2024-08-16 RX ORDER — BISACODYL 10 MG
10 SUPPOSITORY, RECTAL RECTAL DAILY PRN
Status: DISCONTINUED | OUTPATIENT
Start: 2024-08-16 | End: 2024-08-18 | Stop reason: HOSPADM

## 2024-08-16 RX ORDER — PROCHLORPERAZINE 25 MG
25 SUPPOSITORY, RECTAL RECTAL EVERY 12 HOURS PRN
Status: DISCONTINUED | OUTPATIENT
Start: 2024-08-16 | End: 2024-08-16 | Stop reason: HOSPADM

## 2024-08-16 RX ORDER — LOPERAMIDE HCL 2 MG
4 CAPSULE ORAL
Status: DISCONTINUED | OUTPATIENT
Start: 2024-08-16 | End: 2024-08-16 | Stop reason: HOSPADM

## 2024-08-16 RX ORDER — CARBOPROST TROMETHAMINE 250 UG/ML
250 INJECTION, SOLUTION INTRAMUSCULAR
Status: DISCONTINUED | OUTPATIENT
Start: 2024-08-16 | End: 2024-08-18 | Stop reason: HOSPADM

## 2024-08-16 RX ORDER — MODIFIED LANOLIN
OINTMENT (GRAM) TOPICAL
Status: DISCONTINUED | OUTPATIENT
Start: 2024-08-16 | End: 2024-08-18 | Stop reason: HOSPADM

## 2024-08-16 RX ORDER — LOPERAMIDE HCL 2 MG
4 CAPSULE ORAL
Status: DISCONTINUED | OUTPATIENT
Start: 2024-08-16 | End: 2024-08-18 | Stop reason: HOSPADM

## 2024-08-16 RX ORDER — PENICILLIN G POTASSIUM 5000000 [IU]/1
5 INJECTION, POWDER, FOR SOLUTION INTRAMUSCULAR; INTRAVENOUS ONCE
Status: COMPLETED | OUTPATIENT
Start: 2024-08-16 | End: 2024-08-16

## 2024-08-16 RX ORDER — NALOXONE HYDROCHLORIDE 0.4 MG/ML
0.2 INJECTION, SOLUTION INTRAMUSCULAR; INTRAVENOUS; SUBCUTANEOUS
Status: DISCONTINUED | OUTPATIENT
Start: 2024-08-16 | End: 2024-08-16 | Stop reason: HOSPADM

## 2024-08-16 RX ORDER — CITRIC ACID/SODIUM CITRATE 334-500MG
30 SOLUTION, ORAL ORAL
Status: DISCONTINUED | OUTPATIENT
Start: 2024-08-16 | End: 2024-08-16 | Stop reason: HOSPADM

## 2024-08-16 RX ORDER — KETOROLAC TROMETHAMINE 30 MG/ML
30 INJECTION, SOLUTION INTRAMUSCULAR; INTRAVENOUS
Status: COMPLETED | OUTPATIENT
Start: 2024-08-16 | End: 2024-08-16

## 2024-08-16 RX ORDER — MISOPROSTOL 200 UG/1
400 TABLET ORAL
Status: DISCONTINUED | OUTPATIENT
Start: 2024-08-16 | End: 2024-08-16 | Stop reason: HOSPADM

## 2024-08-16 RX ORDER — IBUPROFEN 800 MG/1
800 TABLET, FILM COATED ORAL EVERY 6 HOURS PRN
Status: DISCONTINUED | OUTPATIENT
Start: 2024-08-16 | End: 2024-08-18 | Stop reason: HOSPADM

## 2024-08-16 RX ORDER — METOCLOPRAMIDE HYDROCHLORIDE 5 MG/ML
10 INJECTION INTRAMUSCULAR; INTRAVENOUS EVERY 6 HOURS PRN
Status: DISCONTINUED | OUTPATIENT
Start: 2024-08-16 | End: 2024-08-16 | Stop reason: HOSPADM

## 2024-08-16 RX ORDER — NALOXONE HYDROCHLORIDE 0.4 MG/ML
0.4 INJECTION, SOLUTION INTRAMUSCULAR; INTRAVENOUS; SUBCUTANEOUS
Status: DISCONTINUED | OUTPATIENT
Start: 2024-08-16 | End: 2024-08-16 | Stop reason: HOSPADM

## 2024-08-16 RX ORDER — PROCHLORPERAZINE MALEATE 10 MG
10 TABLET ORAL EVERY 6 HOURS PRN
Status: DISCONTINUED | OUTPATIENT
Start: 2024-08-16 | End: 2024-08-16 | Stop reason: HOSPADM

## 2024-08-16 RX ORDER — TRANEXAMIC ACID 10 MG/ML
1 INJECTION, SOLUTION INTRAVENOUS EVERY 30 MIN PRN
Status: DISCONTINUED | OUTPATIENT
Start: 2024-08-16 | End: 2024-08-16 | Stop reason: HOSPADM

## 2024-08-16 RX ORDER — IBUPROFEN 800 MG/1
800 TABLET, FILM COATED ORAL
Status: COMPLETED | OUTPATIENT
Start: 2024-08-16 | End: 2024-08-16

## 2024-08-16 RX ORDER — OXYTOCIN 10 [USP'U]/ML
10 INJECTION, SOLUTION INTRAMUSCULAR; INTRAVENOUS
Status: DISCONTINUED | OUTPATIENT
Start: 2024-08-16 | End: 2024-08-16 | Stop reason: HOSPADM

## 2024-08-16 RX ORDER — OXYTOCIN/0.9 % SODIUM CHLORIDE 30/500 ML
340 PLASTIC BAG, INJECTION (ML) INTRAVENOUS CONTINUOUS PRN
Status: DISCONTINUED | OUTPATIENT
Start: 2024-08-16 | End: 2024-08-18 | Stop reason: HOSPADM

## 2024-08-16 RX ORDER — OXYTOCIN/0.9 % SODIUM CHLORIDE 30/500 ML
100-340 PLASTIC BAG, INJECTION (ML) INTRAVENOUS CONTINUOUS PRN
Status: DISCONTINUED | OUTPATIENT
Start: 2024-08-16 | End: 2024-08-18 | Stop reason: HOSPADM

## 2024-08-16 RX ORDER — METOCLOPRAMIDE 10 MG/1
10 TABLET ORAL EVERY 6 HOURS PRN
Status: DISCONTINUED | OUTPATIENT
Start: 2024-08-16 | End: 2024-08-16 | Stop reason: HOSPADM

## 2024-08-16 RX ORDER — ONDANSETRON 4 MG/1
4 TABLET, ORALLY DISINTEGRATING ORAL EVERY 6 HOURS PRN
Status: DISCONTINUED | OUTPATIENT
Start: 2024-08-16 | End: 2024-08-16 | Stop reason: HOSPADM

## 2024-08-16 RX ORDER — OXYTOCIN/0.9 % SODIUM CHLORIDE 30/500 ML
340 PLASTIC BAG, INJECTION (ML) INTRAVENOUS CONTINUOUS PRN
Status: DISCONTINUED | OUTPATIENT
Start: 2024-08-16 | End: 2024-08-16 | Stop reason: HOSPADM

## 2024-08-16 RX ORDER — ACETAMINOPHEN 325 MG/1
650 TABLET ORAL EVERY 4 HOURS PRN
Status: DISCONTINUED | OUTPATIENT
Start: 2024-08-16 | End: 2024-08-18 | Stop reason: HOSPADM

## 2024-08-16 RX ORDER — MISOPROSTOL 200 UG/1
800 TABLET ORAL
Status: DISCONTINUED | OUTPATIENT
Start: 2024-08-16 | End: 2024-08-18 | Stop reason: HOSPADM

## 2024-08-16 RX ORDER — DOCUSATE SODIUM 100 MG/1
100 CAPSULE, LIQUID FILLED ORAL DAILY
Status: DISCONTINUED | OUTPATIENT
Start: 2024-08-16 | End: 2024-08-18 | Stop reason: HOSPADM

## 2024-08-16 RX ORDER — PENICILLIN G 3000000 [IU]/50ML
3 INJECTION, SOLUTION INTRAVENOUS EVERY 4 HOURS
Status: DISCONTINUED | OUTPATIENT
Start: 2024-08-16 | End: 2024-08-16 | Stop reason: HOSPADM

## 2024-08-16 RX ORDER — METHYLERGONOVINE MALEATE 0.2 MG/ML
200 INJECTION INTRAVENOUS
Status: DISCONTINUED | OUTPATIENT
Start: 2024-08-16 | End: 2024-08-16 | Stop reason: HOSPADM

## 2024-08-16 RX ORDER — LOPERAMIDE HCL 2 MG
2 CAPSULE ORAL
Status: DISCONTINUED | OUTPATIENT
Start: 2024-08-16 | End: 2024-08-16 | Stop reason: HOSPADM

## 2024-08-16 RX ORDER — MISOPROSTOL 200 UG/1
400 TABLET ORAL
Status: DISCONTINUED | OUTPATIENT
Start: 2024-08-16 | End: 2024-08-18 | Stop reason: HOSPADM

## 2024-08-16 RX ORDER — HYDROCORTISONE 25 MG/G
CREAM TOPICAL 3 TIMES DAILY PRN
Status: DISCONTINUED | OUTPATIENT
Start: 2024-08-16 | End: 2024-08-18 | Stop reason: HOSPADM

## 2024-08-16 RX ORDER — ONDANSETRON 2 MG/ML
4 INJECTION INTRAMUSCULAR; INTRAVENOUS EVERY 6 HOURS PRN
Status: DISCONTINUED | OUTPATIENT
Start: 2024-08-16 | End: 2024-08-16 | Stop reason: HOSPADM

## 2024-08-16 RX ADMIN — PENICILLIN G POTASSIUM 5 MILLION UNITS: 5000000 POWDER, FOR SOLUTION INTRAMUSCULAR; INTRAPLEURAL; INTRATHECAL; INTRAVENOUS at 05:52

## 2024-08-16 RX ADMIN — LIDOCAINE HYDROCHLORIDE 20 ML: 10 INJECTION, SOLUTION EPIDURAL; INFILTRATION; INTRACAUDAL; PERINEURAL at 06:23

## 2024-08-16 RX ADMIN — IBUPROFEN 800 MG: 800 TABLET ORAL at 19:57

## 2024-08-16 RX ADMIN — BENZOCAINE AND LEVOMENTHOL: 200; 5 SPRAY TOPICAL at 10:37

## 2024-08-16 RX ADMIN — DOCUSATE SODIUM 100 MG: 100 CAPSULE, LIQUID FILLED ORAL at 10:37

## 2024-08-16 RX ADMIN — KETOROLAC TROMETHAMINE 30 MG: 30 INJECTION, SOLUTION INTRAMUSCULAR at 06:59

## 2024-08-16 RX ADMIN — Medication 340 ML/HR: at 06:19

## 2024-08-16 RX ADMIN — IBUPROFEN 800 MG: 800 TABLET ORAL at 12:50

## 2024-08-16 RX ADMIN — WITCH HAZEL: 500 SOLUTION RECTAL; TOPICAL at 10:37

## 2024-08-16 ASSESSMENT — ACTIVITIES OF DAILY LIVING (ADL)
ADLS_ACUITY_SCORE: 19
ADLS_ACUITY_SCORE: 21
ADLS_ACUITY_SCORE: 22
ADLS_ACUITY_SCORE: 21
ADLS_ACUITY_SCORE: 19
ADLS_ACUITY_SCORE: 19
ADLS_ACUITY_SCORE: 21
ADLS_ACUITY_SCORE: 19
ADLS_ACUITY_SCORE: 18
ADLS_ACUITY_SCORE: 21
ADLS_ACUITY_SCORE: 18
ADLS_ACUITY_SCORE: 19
ADLS_ACUITY_SCORE: 22
ADLS_ACUITY_SCORE: 21
ADLS_ACUITY_SCORE: 18
ADLS_ACUITY_SCORE: 18

## 2024-08-16 NOTE — LACTATION NOTE
This note was copied from a baby's chart.  Initial Lactation Visit    Hours since delivery: 8 hours old    Gestational age at delivery: 36w5d     Diaper count: 0 wet 0 soiled      Feedings: 2 breast attempt 3 supplement  5-10 maternal expressed breastmilk, 5 formula    Breastfeeding goals:not discussed    Past breastfeeding experience: prime    Maternal health risk that may affect breastfeeding:None per chart    Breast assessment:not assessed    Infant oral assessment: not assessed    Feeding assessment: not assessed, flat/short nipples per bedside RN. Provided a 20mm nipple shield given infant has been sleepy and needed cheek support with feedings.     Feeding plan: Breastfeeding Care Plan for Late /Early Term/Low Birth Weight Baby     Babies born early and/or low birth weight present unique challenges when it comes to feeding and require a proactive approach. They tend to be sleepier than normal, have less energy levels and fat reserves. This can make it difficult to wake for feeds and maintain a deep latch at the breast. Therefore, most times to support your newborns nutritional needs they will need to start a supplement and continue to supplement until your milk increases and they are able to transfer what they need from the breast.     Feed every 2-3 hours. Keep breastfeeding efficient. If infant does not latch within 5-10 minutes, or infant sleepy at breast, or not transferring milk then end feeding at breast.   Positioning reminders:  line up baby's nose to nipple   ear, shoulder, hip, nice straight line   chin off bay's chest; chin touching your breast prior to latch  your thumb lined up like baby's mustache, fingers under breast like a baby's beard  cheeks touching breast  Signs of milk transfer: hearing swallows, comfortable latch, meeting output goals and softening of breasts.   Supplement baby after every breastfeeding with colostrum/breastmilk ( If colostrum/breastmilk is not available, then  donor milk or formula may be used) Use below as a guideline; give more as baby cues.    0-24 hours 10 ml each feeding  24-48 hours   15-20 ml each feeding  48-72 hours 20-30 ml each feeding  72-96 hours 30-60 ml each feeding  96+ hours        and older follow healthcare providers recommendation    Pump for 15-20 minutes   Follow up with your healthcare provider as recommended and lactation consultant within 2-3 days after discharge.          Education:   [] Expected  feeding patterns in the first few days (pg. 38 of Your Guide to To Postpartum and [place] Care)/ the Second Night  [] Stages of milk production  [x] Hand expression   [] Feeding cues     [x] Benefits of skin to skin  [] Breastfeeding positions  [] Tips to get and maintain a deep latch  [] Usage of nipple shield  [] Nutritive vs.non-nutritive sucking  [] Gentle breast compressions as needed  [] How to tell when baby is finished  [x] Supplementation  [] Paced bottle feeding  [] Spoon/cup feeding  [x] LPI feeding patterns  [] LBW feeding patterns  [] Expected  output  [] [place] weight loss  [x] Infant Feeding Log  [] Calming techniques  [] Engorgement/Reverse Pressure Softening  [x] Pumping  [] Breastpump education  [] Inpatient breastfeeding support  [] Outpatient lactation resources    Follow up: Will follow up tomorrow

## 2024-08-16 NOTE — PROGRESS NOTES
Data: Prosperity Moo transferred to 19 via wheelchair at 0855. Baby transferred via crib.  Action: Receiving unit notified of transfer: Yes. Patient and family notified of room change. Report given to TEODORO RN at 0850. Belongings sent to receiving unit. Accompanied by Registered Nurse. Oriented patient to surroundings by TEODORO. Call light within reach. ID bands double-checked with receiving RN.  Response: Patient tolerated transfer and is stable.

## 2024-08-16 NOTE — PROGRESS NOTES
Pt is a 23 yo  @ 36w5d who presented with c/o bloody spotting, low back discomfort about q 5 min. VSS, LGL363, category 1 tracing. Difficult to  ctx in some positions, palpate mild to moderate with a soft resting tone. Dr Oliva called at 0428 and inst to check pt. SVE /-100/0. Pt has N/V. Expecting a girl, will breast and formula feed. Pt transferred to [facility] with partner and all belongings. Dr Oliva is coming in for delivery. Partner is present and supportive. Kimberli  used when pt arrived to triage and is currently in use. Stable, anticipate .

## 2024-08-16 NOTE — PLAN OF CARE
Problem: Postpartum (Vaginal Delivery)  Goal: Effective Urinary Elimination  Outcome: Progressing  Intervention: Monitor and Manage Urinary Retention  Recent Flowsheet Documentation  Taken 2024 0825 by Leeann Guevara RN  Urinary Elimination Promotion:   toileting offered   absorbent pad/diaper use encouraged   frequent voiding encouraged   positioned for ease of voiding   Goal Outcome Evaluation:      Plan of Care Reviewed With: patient    Overall Patient Progress: improvingOverall Patient Progress: improving    Outcome Evaluation: stable postpartum transition following     Encouraged oral hydration and early attempts at voiding. Voided 300 ml spontaneously in addition to additional void while in bath tub.    Problem: Breastfeeding  Goal: Effective Breastfeeding  Intervention: Promote Effective Breastfeeding  Recent Flowsheet Documentation  Taken 2024 0735 by Leeann Guevara RN  Breastfeeding Assistance:   assisted with positioning   assisted with techniques for flat/inverted nipples   feeding cue recognition promoted   feeding on demand promoted   hand expression verified   infant stimulated to wakeful state   support offered  Taken 2024 0722 by Leeann Guevara RN  Parent-Child Attachment Promotion:   caring behavior modeled   cue recognition promoted   face-to-face positioning promoted   interaction encouraged   parent/caregiver presence encouraged   participation in care promoted   positive reinforcement provided   rooming-in promoted   strengths emphasized   Encouraged early and frequent on demand breast feeding, at least every 3 hours. Reviewed feeding cues via . Unable to latch infant to breast with attempt, so demonstrated and reviewed hand expression with mother and assisted father with spoon feeding EBM. Discussed potential future use of breast pump as well.

## 2024-08-16 NOTE — PLAN OF CARE
Problem: Adult Inpatient Plan of Care  Goal: Absence of Hospital-Acquired Illness or Injury  Intervention: Prevent Infection  Recent Flowsheet Documentation  Taken 8/16/2024 0657 by Tami Goldsmith RN  Infection Prevention:   environmental surveillance performed   equipment surfaces disinfected   hand hygiene promoted   personal protective equipment utilized   rest/sleep promoted   single patient room provided   visitors restricted/screened     Problem: Adult Inpatient Plan of Care  Goal: Optimal Comfort and Wellbeing  Intervention: Monitor Pain and Promote Comfort  Recent Flowsheet Documentation  Taken 8/16/2024 0657 by Tami Goldsmith RN  Pain Management Interventions:   medication (see MAR)   emotional support   Goal Outcome Evaluation:      Plan of Care Reviewed With: patient, significant other    Overall Patient Progress: improving  Overall Patient Progress: improving     [place] delivered a baby girl at 0614. [place] is vitally stable and afebrile. Was able to receive one dose of penicillin prior to delivery d/t unknown GBS status. GBS swab collected. Rating pain 3-4/10, given a dose of IV toradol. Report given to Victorina DELANEY RN.

## 2024-08-16 NOTE — L&D DELIVERY NOTE
OB Vaginal Delivery Note    Gume King MRN# [id number]   Age: 24 year old YOB: 2000       GA: 36w5d  GP:   Labor Complications: None   EBL:   mL  Delivery QBL: 625 mL  Delivery Type: Vaginal, Spontaneous   ROM to Delivery Time: (Delivered) Minutes: 56   Weight: 2.48 kg (5 lb 7.5 oz)    1 Minute 5 Minute 10 Minute   Apgar Totals: 8   9        MARIAH ESPINOZA;MILO CASTAÑEDA;ELISA GILLIS;CRISTOFER THORNE;TRIPP OWENS     Delivery Details:  Gume King, a 24 year old  female delivered a viable infant with apgars of 8  and 9 . Patient was fully dilated and pushing after 4  hours 17  minutes in active labor. Delivery was via vaginal, spontaneous  to a sterile field under none  anesthesia. Infant delivered in vertex  left  occiput  anterior  position. Anterior and posterior shoulders delivered without difficulty. The cord was clamped, cut twice and 3 vessels  were noted. Cord blood was obtained in routine fashion with the following disposition: lab .      GBS unknown- obtained swab and gave a partial dose of penicillin. Will stay 48 hours.     Cord complications: none   Placenta delivered at  . Placental disposition was Hospital disposal . Fundal massage performed and fundus found to be firm.     Episiotomy: none    Perineum, vagina, cervix were inspected, and the following lacerations were noted:   Perineal lacerations: 2nd     labial laceration: bilateral         Repaired LEFT labial laceration. Did not repair right, as it was very superficial and not bleeding .    Any lacerations were repaired in the usual fashion using 3-0 vicryl and 4-0 vicryl.    Excellent hemostasis was noted. Needle count correct. Infant and patient in delivery room in good and stable condition.        Fernando, Female-[place] [[id number]]      Labor Event Times      Active labor onset date: 24 Onset time:  1:00 AM   Dilation complete date: 24 Complete time:  5:17 AM   Start pushing date/time: 2024 0556           Labor Length      1st Stage (hrs): 4 (min): 17   2nd Stage (hrs): 0 (min): 57          Labor Events     labor?: No   steroids: None  Labor Type: Spontaneous, AROM  Predominate monitoring during 1st stage: continuous electronic fetal monitoring     Antibiotics received during labor?: Yes  Reason for Antibiotics: GBS  Antibiotics received for GBS: Penicillin  Antibiotics Given (GBS): Less than or equal to 4 hours prior to delivery     Rupture identifier: Sac 1  Rupture date/time: 24 0518   Rupture type: Artificial Rupture of Membranes  Fluid color: Clear  Fluid odor: Normal     Augmentation: AROM  Indications for augmentation: Ineffective Contraction Pattern       Delivery/Placenta Date and Time      Delivery Date: 24 Delivery Time:  6:14 AM   Oxytocin given at the time of delivery: after delivery of baby  Delivering clinician: Hermelinda Oliva MD   Other personnel present at delivery:  Provider Role   Herminia Kaur RN Registered Nurse   Brook Merida RN Registered Nurse   Tami Goldsmith RN Registered Nurse   Amara Mattson RN Registered Nurse   Alia Pemberton, APRN CNP              Vaginal Counts       Initial count performed by 2 team members:  Two Team Members   DAVON Asif Dr.         Needles Suture Needles Sponges (RETIRED) Instruments   Initial counts 0 0 5    Added to count 1 3 0    Relief counts       Final counts 1 3 5            Placed during labor Accounted for at the end of labor   FSE No NA   IUPC No NA   Cervidil No NA                  Final count performed by 2 team members:  Two Team Members   Dr. Hazel Kaur RN      Final count correct?: Yes  Pre-Birth Team Brief: Complete  Post-Birth Team Debrief: Complete       Apgars    Living status: Living   1 Minute 5 Minute 10 Minute 15 Minute 20 Minute   Skin color: 0  1       Heart rate: 2  2       Reflex irritability: 2  2       Muscle tone: 2  2       Respiratory effort: 2  2       Total: 8  9      "  Apgars assigned by: MILO CASTAÑEDA       Cord      Vessels: 3 Vessels    Cord Complications: None               Cord Blood Disposition: Lab    Gases Sent?: No    Delayed cord clamping?: Yes    Cord Clamping Delay (seconds): 31-60 seconds    Stem cell collection?: No            Resuscitation    Methods: None  [place] Care at Delivery: Requested to attend the delivery due to late  36 5/7 with late decels.  Infant delivered with spontaneous cry and respirations, went to mother's chest for delayed cord clamping.  NICU team not needed and dismissed.     Alia Pemberton APRN, CNP 2024, 6:17 AM          Measurements      Weight: 5 lb 7.5 oz Length: 1' 6\"     Head circumference: 30.5 cm           Skin to Skin and Feeding Plan      Skin to skin initiation date/time: 1841    Skin to skin with: Mother  Skin to skin end date/time: 1841           Labor Events and Shoulder Dystocia    Fetal Tracing Prior to Delivery: Category 1  Shoulder dystocia present?: Neg       Delivery (Maternal) (Provider to Complete) ([id number])    Episiotomy: None  Perineal lacerations: 2nd Repaired?: Yes     Labial laceration: bilateral Repaired?: Yes   Repair suture: 3-0 Vicryl, 4-0 Vicryl  Number of repair packets: 1  Genital tract inspection done: Pos       Blood Loss  Mother: FernandoGume #[id number]     Start of Mother's Information      Delivery Blood Loss  24 0100 - 24 0723      Delivery QBL (mL) Hospital Encounter 625 mL    Total  625 mL               End of Mother's Information  Mother: Gume King #[id number]                Delivery - Provider to Complete ([id number])    Delivering clinician: Hermelinda Oliva MD  Delivery Type (Choose the 1 that will go to the Birth History): Vaginal, Spontaneous                         Other personnel:  Provider Role   Herminia Kaur RN Registered Nurse   Milo Castañeda RN Registered Nurse   Tami Goldsmith RN Registered Nurse   Amara Mattson RN Registered Nurse   Niecy, " NITA Murray CNP                     Placenta    Removal: Spontaneous  Disposition: Hospital disposal             Anesthesia    Method: None                    Presentation and Position    Presentation: Vertex    Position: Left Occiput Anterior                     Hermelinda Oliva MD

## 2024-08-16 NOTE — LETTER
[place] [address] [facility] [address]  Phone: [phone]  Fax: [phone]    August 18, 2024        Gume King  [address] [place] [id number]          To whom it may concern:    RE: Gume King    Please excuse mom from work due to the birth of her baby on 8/16/24.    Please contact me for questions or concerns.      Sincerely,      Nasreen Viramontes MD

## 2024-08-16 NOTE — PLAN OF CARE
Problem: Adult Inpatient Plan of Care  Goal: Plan of Care Review  Description: The Plan of Care Review/Shift note should be completed every shift.  The Outcome Evaluation is a brief statement about your assessment that the patient is improving, declining, or no change.  This information will be displayed automatically on your shift  note.  Outcome: Progressing  Flowsheets (Taken 8/16/2024 1439)  Plan of Care Reviewed With: patient  Overall Patient Progress: improving       Problem: Postpartum (Vaginal Delivery)  Goal: Optimal Pain Control and Function  Outcome: Progressing  Intervention: Prevent or Manage Pain  Recent Flowsheet Documentation  Taken 8/16/2024 1350 by Yoanna Mclaughlin, RN  Perineal Care:   absorbent brief/pad changed   medicated pads applied   perineal spray bottle/warm water use encouraged   perineum cleansed  Taken 8/16/2024 1030 by Yoanna Mclaughlin RN  Perineal Care:   absorbent brief/pad changed   medicated pads applied   perineal spray bottle/warm water use encouraged   perineum cleansed   topical anesthetic preparation applied  Goal: Effective Urinary Elimination  Outcome: Progressing  Intervention: Monitor and Manage Urinary Retention  Recent Flowsheet Documentation  Taken 8/16/2024 1250 by Yoanna Mclaughlin RN  Urinary Elimination Promotion: frequent voiding encouraged  Taken 8/16/2024 1030 by Yoanna Mclaughlin RN  Urinary Elimination Promotion: frequent voiding encouraged     Goal Outcome Evaluation:      Plan of Care Reviewed With: patient    Overall Patient Progress: improving    VSS. Patient up independently to bathroom and asymptomatic. Patient tolerating regular diet, hydrating well and is voiding spontaneously. Pain is managed with ibuprofen. Aware ice, heat and tylenol are also available. Enc tub soak and lavendar salts given. Patient provided with benzocaine spray and tucks as well as instructed on use.   Patient enc to ask questions, concerns and make needs  known.

## 2024-08-16 NOTE — H&P
Maternal Admission H&P  [facility]  Date of Admission: 2024  Date of Service: 2024    Name      Gume King         2000  MRN       [id number]  PCP        Dr. Hermelinda Oliva at [facility].    ________________________________________________________________________    Assessment and Plan:  24 year old  at 36w5d.  Baby AGA. Anticipate .  Group B strep: unknown. Will start antibiotics though will likely not get a dose in.   Late  infant- will notify NICU.   AROM performed with patient's permission to augment labor since she was very uncomfortable (vomiting with contractions, etc).   ________________________________________________________________________    Chief Complaint:  labor.    HPI: Gume King is a 24 year old woman at 36w5d, based on an Estimated Date of Delivery: Sep 8, 2024. She presents with contractions which started around 0100. She was 9/100/0 on arrival.     She has a history of a stillborn infant around approximately 18 weeks. Presented with rupture of membranes, had had no prenatal care prior.     NIPT for this pregnancy was normal.     Patient Active Problem List    Diagnosis Date Noted    Family history of diabetes mellitus in mother 2024     Priority: Medium    Delivery outcome of stillborn infant 2024     Priority: Medium    Need for Tdap vaccination 2024     Priority: Medium    Constipation 10/03/2023     Priority: Medium     Created by Conversion  Replacement Utility updated for latest IMO load  Formatting of this note might be different from the original.   Formatting of this note might be different from the original. Created by Conversion Replacement Utility updated for latest IMO load      Abdominal pain 10/03/2023     Priority: Medium     Created by Conversion  Replacement Utility updated for latest IMO load  Formatting of this note might be different from the original.   Formatting of this  note might be different from the original. Created by Conversion Replacement Utility updated for latest IMO load      High-risk pregnancy, unspecified trimester 2019     Priority: Medium    Dermatitis      Priority: Medium     Created by Conversion        Giardiasis 2011     Priority: Medium      OB History    Para Term  AB Living   2 1 0 1 0 0   SAB IAB Ectopic Multiple Live Births   0 0 0 0 0      # Outcome Date GA Lbr Gregorio/2nd Weight Sex Type Anes PTL Lv   2 Current            1  19 22w3d  0.51 kg (1 lb 2 oz)  Vag-Spont None  FD      Birth Comments: stillbirth       Name: MOO,BABY-PRUE FD      Apgar1: 0  Apgar5: 0     Review of Systems Negative except what is noted in HPI.   Past Medical History:   Diagnosis Date    Varicella       History reviewed. No pertinent surgical history.Patient has no known allergies.  Family History   Problem Relation Age of Onset    Hypertension Mother     Diabetes Mother     Diabetes Maternal Grandmother     No Known Problems Brother     No Known Problems Brother     No Known Problems Brother     Stillborn Son      Social History     Socioeconomic History    Marital status: Single     Spouse name: Not on file    Number of children: Not on file    Years of education: Not on file    Highest education level: Not on file   Occupational History    Not on file   Tobacco Use    Smoking status: Never     Passive exposure: Never    Smokeless tobacco: Never    Tobacco comments:     Father smokes outside   Vaping Use    Vaping status: Never Used   Substance and Sexual Activity    Alcohol use: Never    Drug use: Never    Sexual activity: Yes     Partners: Male     Birth control/protection: None   Other Topics Concern    Not on file   Social History Narrative    19: Presents to the ED with mother and father.      Social Determinants of Health     Financial Resource Strain: Unknown (10/12/2023)    Financial Resource Strain     Within the past 12 months,  have you or your family members you live with been unable to get utilities (heat, electricity) when it was really needed?: Patient refused   Food Insecurity: Low Risk  (10/12/2023)    Food Insecurity     Within the past 12 months, did you worry that your food would run out before you got money to buy more?: No     Within the past 12 months, did the food you bought just not last and you didn t have money to get more?: No   Transportation Needs: Unknown (10/12/2023)    Transportation Needs     Within the past 12 months, has lack of transportation kept you from medical appointments, getting your medicines, non-medical meetings or appointments, work, or from getting things that you need?: Patient refused   Physical Activity: Not on file   Stress: Not on file   Social Connections: Not on file   Interpersonal Safety: Low Risk  (5/8/2024)    Interpersonal Safety     Do you feel physically and emotionally safe where you currently live?: Yes     Within the past 12 months, have you been hit, slapped, kicked or otherwise physically hurt by someone?: No     Within the past 12 months, have you been humiliated or emotionally abused in other ways by your partner or ex-partner?: No   Housing Stability: Low Risk  (10/12/2023)    Housing Stability     Do you have housing? : Yes     Are you worried about losing your housing?: Patient refused     Prior to Admission Medication List  Medications Prior to Admission   Medication Sig Dispense Refill Last Dose    Prenatal Vit-Fe Fumarate-FA (PRENATAL MULTIVITAMIN W/IRON) 27-0.8 MG tablet Take 1 tablet by mouth daily 90 tablet 3 8/15/2024      Allergies  No Known Allergies  Immunization History   Administered Date(s) Administered    COVID-19 12+ (2023-24) (Pfizer) 10/12/2023    COVID-19 Vaccine ([employer]) 06/24/2021, 12/23/2021    DTAP (<7y) 02/25/2011, 08/30/2011, 03/30/2012    DTaP, Unspecified 02/25/2011, 08/30/2011, 03/30/2012    HEPATITIS A (PEDS 12M-18Y) 05/10/2013    HPV9 04/07/2017,  "12/01/2017    HepA, Unspecified 10/09/2012    HepB, Unspecified 02/25/2011, 08/30/2011, 05/10/2013    Hepatitis A (ADULT 19+) 10/09/2012    Hepatitis B, Adult 05/10/2013    Hepatitis B, Peds 08/30/2011, 11/04/2011    Influenza (IIV3) PF 10/09/2012, 11/19/2013    Influenza Intranasal Vaccine 08/30/2011    Influenza Vaccine >6 months,quad, PF 12/01/2017, 10/12/2023    Influenza Vaccine, 6+MO IM (QUADRIVALENT W/PRESERVATIVES) 10/09/2012, 11/19/2015    Influenza,INJ,MDCK,PF,Quad >6mo(Flucelvax) 10/18/2020, 11/05/2022    MMR 02/25/2011, 08/30/2011    Meningococcal ACWY (Menactra ) 10/09/2012, 12/01/2017    OPV, trivalent, live 02/25/2011    Poliovirus, inactivated (IPV) 02/25/2011, 08/30/2011, 10/04/2011, 11/04/2011, 08/06/2013, 03/04/2014    TDAP (Adacel,Boostrix) 08/30/2011    Td (Adult), Adsorbed 02/25/2011, 05/10/2013    Td,adult,historic,unspecified 05/10/2013    Varicella 10/09/2012, 05/10/2013      Physical Exam  Patient Vitals for the past 24 hrs:   BP Temp Temp src Resp Height Weight   08/16/24 0323 100/70 98.3  F (36.8  C) Oral 18 1.549 m (5' 1\") 72.8 kg (160 lb 8 oz)     Wt Readings from Last 1 Encounters:   08/16/24 72.8 kg (160 lb 8 oz)   Prepregnancy: 63 kg (139 lb), BMI 26.28. Total gain: 9.752 kg (21 lb 8 oz) (expected gain: 7 kg (15 lb)-11.5 kg (25 lb)).    HEART: RRR, no murmur  LUNGS: CTA bilaterally  Fetal Heart Tones: Baseline 140 bpm, variability moderate (6-25 bpm), no decelerations. Reactive.  CONTRACTIONS:  irregular, every 3-5 minutes.  CERVIX: dilation 10 cm , 100% effaced, soft, and +1 station.  FLUID: Clear.  Fetal Presentation: vertex.  Labs  O pos  Hep B neg  Gbs unknown  No results found for this or any previous visit (from the past 24 hour(s)). No results found for: \"GBPCRT\"   Antibody Screen   Date Value Ref Range Status   02/12/2024 Negative Negative Final     Hepatitis B Surface Antigen   Date Value Ref Range Status   02/12/2024 Nonreactive Nonreactive Final     Chlamydia trachomatis "   Date Value Ref Range Status   10/12/2023 Negative Negative Final     Comment:     A negative result by transcription mediated amplification does not preclude the presence of C. trachomatis infection because results are dependent on proper and adequate collection, absence of inhibitors and sufficient rRNA to be detected.     Neisseria gonorrhoeae   Date Value Ref Range Status   10/12/2023 Negative Negative Final     Comment:     Negative for N. gonorrhoeae rRNA by transcription mediated amplification. A negative result by transcription mediated amplification does not preclude the presence of C. trachomatis infection because results are dependent on proper and adequate collection, absence of inhibitors and sufficient rRNA to be detected.     Hemoglobin   Date Value Ref Range Status   06/12/2024 11.0 (L) 11.7 - 15.7 g/dL Final        Completed by:   Hermelinda Oliva MD  [facility]  8/16/2024 5:22 AM   used: Ayesha

## 2024-08-17 LAB — GP B STREP DNA SPEC QL NAA+PROBE: POSITIVE

## 2024-08-17 PROCEDURE — 99207 PR SUBSEQUENT HOSPITAL CARE FOR MOTHER, 15 MINUTES: CPT | Performed by: FAMILY MEDICINE

## 2024-08-17 PROCEDURE — 90715 TDAP VACCINE 7 YRS/> IM: CPT | Performed by: FAMILY MEDICINE

## 2024-08-17 PROCEDURE — 120N000001 HC R&B MED SURG/OB

## 2024-08-17 PROCEDURE — 250N000011 HC RX IP 250 OP 636: Performed by: FAMILY MEDICINE

## 2024-08-17 PROCEDURE — 90471 IMMUNIZATION ADMIN: CPT | Performed by: FAMILY MEDICINE

## 2024-08-17 PROCEDURE — 250N000013 HC RX MED GY IP 250 OP 250 PS 637: Performed by: FAMILY MEDICINE

## 2024-08-17 RX ADMIN — CLOSTRIDIUM TETANI TOXOID ANTIGEN (FORMALDEHYDE INACTIVATED), CORYNEBACTERIUM DIPHTHERIAE TOXOID ANTIGEN (FORMALDEHYDE INACTIVATED), BORDETELLA PERTUSSIS TOXOID ANTIGEN (GLUTARALDEHYDE INACTIVATED), BORDETELLA PERTUSSIS FILAMENTOUS HEMAGGLUTININ ANTIGEN (FORMALDEHYDE INACTIVATED), BORDETELLA PERTUSSIS PERTACTIN ANTIGEN, AND BORDETELLA PERTUSSIS FIMBRIAE 2/3 ANTIGEN 0.5 ML: 5; 2; 2.5; 5; 3; 5 INJECTION, SUSPENSION INTRAMUSCULAR at 14:14

## 2024-08-17 RX ADMIN — IBUPROFEN 800 MG: 800 TABLET ORAL at 21:34

## 2024-08-17 RX ADMIN — ACETAMINOPHEN 650 MG: 325 TABLET ORAL at 12:05

## 2024-08-17 RX ADMIN — ACETAMINOPHEN 650 MG: 325 TABLET ORAL at 23:17

## 2024-08-17 RX ADMIN — DOCUSATE SODIUM 100 MG: 100 CAPSULE, LIQUID FILLED ORAL at 07:58

## 2024-08-17 RX ADMIN — ACETAMINOPHEN 650 MG: 325 TABLET ORAL at 04:10

## 2024-08-17 RX ADMIN — IBUPROFEN 800 MG: 800 TABLET ORAL at 14:14

## 2024-08-17 RX ADMIN — IBUPROFEN 800 MG: 800 TABLET ORAL at 07:59

## 2024-08-17 ASSESSMENT — ACTIVITIES OF DAILY LIVING (ADL)
ADLS_ACUITY_SCORE: 19
ADLS_ACUITY_SCORE: 18
ADLS_ACUITY_SCORE: 19
ADLS_ACUITY_SCORE: 18
ADLS_ACUITY_SCORE: 19
ADLS_ACUITY_SCORE: 18
ADLS_ACUITY_SCORE: 19
ADLS_ACUITY_SCORE: 18
ADLS_ACUITY_SCORE: 19
ADLS_ACUITY_SCORE: 18
ADLS_ACUITY_SCORE: 19
ADLS_ACUITY_SCORE: 18
ADLS_ACUITY_SCORE: 19
ADLS_ACUITY_SCORE: 18
ADLS_ACUITY_SCORE: 19

## 2024-08-17 NOTE — PROGRESS NOTES
Patient reports headache, states she thinks it is from not getting much sleep, baby currently in NICU for car seat trial, coffee ordered as requested, tylenol given, lights adjusted, patient to rest. Denies nausea, denies UQ pain, denies visual disturbance. Assisted on ordering lunch. R-eval in 30-60 minutes.   Edu s/s to contact RN/providers.

## 2024-08-17 NOTE — PLAN OF CARE
Problem: Adult Inpatient Plan of Care  Goal: Plan of Care Review  Outcome: Progressing  Flowsheets (Taken 2024 0842)  Outcome Evaluation: stable postpartum following   Plan of Care Reviewed With: patient  Overall Patient Progress: improving     Problem: Postpartum (Vaginal Delivery)  Goal: Optimal Pain Control and Function  Outcome: Progressing  Goal: Effective Urinary Elimination  Outcome: Met     Goal Outcome Evaluation:      Plan of Care Reviewed With: patient    Overall Patient Progress: improvingOverall Patient Progress: improving    Outcome Evaluation: stable postpartum following     VSS. Pain is well managed with Ibuprofen and tylenol. Reports is using tucks and benzocaine spray. Patient is up ad patti and wearing abdominal binder. Reports passing pas, denies bowel movement. Enc hydration and tolerating regular diet.    Enc to pump with each bottle and observed setting up pump with encouragement, felt very crampy, small amount colostrum seen on phalanges and finger fed to baby.

## 2024-08-17 NOTE — PLAN OF CARE
Patient's vitals within normal parameters. She reports her pain is adequately controlled.   She continues to ambulate well independently and void without difficulty.   Lochia appropriate. Fundus firm, midline, and 1 below umbilicus.   Hgb re-check in the am.       Goal Outcome Evaluation:      Plan of Care Reviewed With: patient    Overall Patient Progress: improvingOverall Patient Progress: improving       Dannielle Mercedes RN

## 2024-08-17 NOTE — PLAN OF CARE
Patient's vitals within normal parameters.   She reports her pain is 2/10 (uterine cramping). Patient requested and received prn oral Ibuprofen, which was effective.   Patient is ambulating well on her own and voiding without difficulty.   [facility]  offered. Patient declined  services.   Lochia appropriate. Fundus firm, midline, and 1 below umbilicus.   Hgb re-check in the am.   Positive attachment behaviors noted with patient and her .   Significant other, mother, and siblings at bedside and helpful with cares.    Goal Outcome Evaluation:      Plan of Care Reviewed With: patient    Overall Patient Progress: improvingOverall Patient Progress: improving         Dannielle Mercedes RN

## 2024-08-17 NOTE — PROGRESS NOTES
Maternal Postpartum Progress Note  [facility]  Date of Service: 2024    Name      Gume King         2000  MRN       [id number]  PCP        Hermelinda Oliva    ________________________________________________________________________  Patient is now24 year old  s/p  at 36w5d, delivered yesterday by Hermelinda Sheehan.  2nd degree lacerations and no complications.     Assessment:   Postpartum Day #1 s/p vaginal delivery  doing well without concerns.  Due for adacel prior to discharge     Patient Active Problem List    Diagnosis Date Noted     labor in third trimester with  delivery 2024     Priority: Medium    Family history of diabetes mellitus in mother 2024     Priority: Medium    Delivery outcome of stillborn infant 2024     Priority: Medium    Need for Tdap vaccination 2024     Priority: Medium    Constipation 10/03/2023     Priority: Medium     Created by Conversion  Replacement Utility updated for latest IMO load  Formatting of this note might be different from the original.   Formatting of this note might be different from the original. Created by Conversion Replacement Utility updated for latest IMO load      Abdominal pain 10/03/2023     Priority: Medium     Created by Conversion  Replacement Utility updated for latest IMO load  Formatting of this note might be different from the original.   Formatting of this note might be different from the original. Created by Conversion Replacement Utility updated for latest IMO load      High-risk pregnancy, unspecified trimester 2019     Priority: Medium    Dermatitis      Priority: Medium     Created by Conversion        Giardiasis 2011     Priority: Medium        Plan:   1) Continue routine postpartum cares.  2) Lactation: working with mom on pumping   3) VACCINATIONS: due for adacel on discharge, flu and covid boosters this fall.    4) DISPO: anticipate  discharge home tomorrow.   Patient agreeable to home nurse visit for herself and  after discharge.    Subjective:  Patient doing well with no concerns. Lochia is mild without clots.  Pain is well controlled with Ibuprofen.  Eating and ambulating well. Normal urine output. No constipation.  Patient denies headache, dizziness, dyspnea, and leg pain.  The baby is well and being fed by both breast and bottle.  Lactation working with mom- pumping     Adacel not given during pregnancy- last was      Objective:  Patient Vitals for the past 24 hrs:   BP Temp Temp src Pulse Resp SpO2   24 0741 101/65 98.1  F (36.7  C) Oral 94 18 97 %   24 0350 95/51 98.6  F (37  C) Oral 83 16 97 %   24 2342 103/54 97.9  F (36.6  C) Oral 77 16 96 %   24 1944 108/55 98.4  F (36.9  C) Oral 83 16 98 %   24 1700 95/55 97  F (36.1  C) Axillary 68 16 97 %   24 1315 95/58 -- -- -- -- --   24 1250 93/48 97  F (36.1  C) Axillary 70 16 98 %     General: Alert, comfortable.  Heart: RRR, no murmur.  Lungs: CTA bilaterally.  Abdomen: non-distended. Uterine fundus firm, below umbilicus.  Ext: trace edema, no calf tenderness.    Labs:  No results found for this or any previous visit (from the past 24 hour(s)).         Completed by:   Nasreen Viramontes MD, M.D.  [facility]  2024 11:32 AM   used: Ayesha

## 2024-08-17 NOTE — PROGRESS NOTES
3rd call to lab for HGB results, lab states the specimen was lost and would need to be redrawn.   Dr Viramontes on unit and ordered to cancel HGB order.

## 2024-08-17 NOTE — PLAN OF CARE
Problem: Adult Inpatient Plan of Care  Goal: Optimal Comfort and Wellbeing  Intervention: Monitor Pain and Promote Comfort  Recent Flowsheet Documentation  Taken 2024 1414 by Yoanna Mclaughlin, RN  Pain Management Interventions: medication (see MAR)  Taken 2024 1205 by Yoanan Mclaughlin, RN  Pain Management Interventions:   medication (see MAR)   environmental changes  Taken 2024 0759 by Yoanna Mclaughlin, RN  Pain Management Interventions:   medication (see MAR)   pillow support provided       Goal Outcome Evaluation:      Plan of Care Reviewed With: patient    Overall Patient Progress: improvingOverall Patient Progress: improving    Outcome Evaluation: stable postpartum following     Pain is well managed with tylenol and ibuprofen. Patient is wearing abdominal binder for support. Patient is using benzocaine spray, tucks and nipple cream prn. Bath salts provided and hot water brought into room for tub soak.   Patient has been reviewing PNC handout per RN instruction and asking appropriate questions

## 2024-08-18 VITALS
TEMPERATURE: 98.7 F | BODY MASS INDEX: 30.51 KG/M2 | DIASTOLIC BLOOD PRESSURE: 63 MMHG | HEIGHT: 61 IN | OXYGEN SATURATION: 98 % | WEIGHT: 161.6 LBS | SYSTOLIC BLOOD PRESSURE: 127 MMHG | RESPIRATION RATE: 16 BRPM | HEART RATE: 72 BPM

## 2024-08-18 PROCEDURE — 99207 PR SUBSEQUENT HOSPITAL CARE FOR MOTHER, 15 MINUTES: CPT | Performed by: FAMILY MEDICINE

## 2024-08-18 PROCEDURE — 250N000013 HC RX MED GY IP 250 OP 250 PS 637: Performed by: FAMILY MEDICINE

## 2024-08-18 RX ORDER — DOCUSATE SODIUM 100 MG/1
100 CAPSULE, LIQUID FILLED ORAL DAILY
Qty: 30 CAPSULE | Refills: 0 | Status: SHIPPED | OUTPATIENT
Start: 2024-08-18

## 2024-08-18 RX ORDER — IBUPROFEN 800 MG/1
800 TABLET, FILM COATED ORAL EVERY 6 HOURS PRN
Qty: 30 TABLET | Refills: 0 | Status: SHIPPED | OUTPATIENT
Start: 2024-08-18

## 2024-08-18 RX ADMIN — IBUPROFEN 800 MG: 800 TABLET ORAL at 06:03

## 2024-08-18 RX ADMIN — WITCH HAZEL: 500 SOLUTION RECTAL; TOPICAL at 08:57

## 2024-08-18 RX ADMIN — BENZOCAINE AND LEVOMENTHOL: 200; 5 SPRAY TOPICAL at 08:58

## 2024-08-18 RX ADMIN — DOCUSATE SODIUM 100 MG: 100 CAPSULE, LIQUID FILLED ORAL at 08:51

## 2024-08-18 ASSESSMENT — ACTIVITIES OF DAILY LIVING (ADL)
ADLS_ACUITY_SCORE: 18

## 2024-08-18 NOTE — PLAN OF CARE
Problem: Adult Inpatient Plan of Care  Goal: Plan of Care Review  Outcome: Progressing  Flowsheets (Taken 8/18/2024 1007)  Plan of Care Reviewed With: patient  Overall Patient Progress: improving    Goal Outcome Evaluation:      Plan of Care Reviewed With: patient    Overall Patient Progress: improving    Reviewing PNC handout with patient and significant other, RN cut out and reviewed warning signs on pages 47/48 of PNC with patient & significant other as well as instructed to hang on fridge.  RN contacted [facility] to verify scheduling ability to for mom/baby appointments on Monday or Tuesday, coordinator with outreach patient today and patient informed.

## 2024-08-18 NOTE — DISCHARGE SUMMARY
Maternal Discharge Summary  [facility]  Date of Service: 2024    Name      Gume King         2000  MRN       [id number]  PCP        Hermelinda Sheehan at [facility], [phone].    Admission Date:  2024  Discharge Date:  2024  ________________________________________________________________________    Assessment:  Gume King is a 24 year old now  s/p vaginal, spontaneous  at 36w5d on 24.  Active Problems:    Need for Tdap vaccination     labor in third trimester with  delivery        Discharge Plan:   Discharge to Home. Condition at Discharge:  Stable  Physical activity:as tolerated  Watch for postpartum warning signs as discussed  Diet:  as tolerated  Home care nurse: ordered for 1-2 days from now.  Lactation clinic appointment: ordered.  Contraception plan: IUD.  Follow up with Hermelinda Sheehan in 4 weeks.  Future Appointments   Date Time Provider Department Center   2024  3:40 PM Hermelinda Oliva MD [facility]   2024  2:00 PM Hermelinda Oliva MD [facility]   2024  3:40 PM Hermelinda Oliva MD [facility]   2024  3:40 PM Hermelinda Oliva MD [facility]   2024  3:40 PM Hermelinda Oliva MD [facility]      8.   Immunizations:tdap in hospital     ________________________________________________________________________      Estimated Date of Delivery: Data Unavailable  Gestational Age at Delivery: 36w5d    Principal Diagnosis: Labor and delivery  Delivery type: Vaginal, Spontaneous     Subjective:  Postpartum day #2 s/p NVD  Patient doing well with no concerns. Lochia is mod without clots.  Pain is well controlled with Ibuprofen.  Eating and ambulating well. Normal urine output. Normal mood and affect.  Patient denies headache, dizziness, dyspnea, and leg pain.   Lactation working with mom on pumping and cup feeding  The baby is well and being fed by both breast and bottle.      Discharge Exam:  Patient Vitals for the past 24 hrs:   BP Temp Temp src Pulse Resp SpO2   08/17/24 2330 103/55 99.1  F (37.3  C) Oral 72 16 98 %   08/17/24 1940 102/60 98.9  F (37.2  C) Oral 61 18 98 %   08/17/24 1603 97/58 98.2  F (36.8  C) Oral 75 18 98 %   08/17/24 0741 101/65 98.1  F (36.7  C) Oral 94 18 97 %     General - alert, comfortable  Heart - RRR, no murmurs  Lungs - CTA bilaterally  Abdomen - fundus firm, nontender, below umbilicus  Extremities - trace edema  Admission on 08/16/2024   Component Date Value Ref Range Status    Hemoglobin 08/16/2024 12.2  11.7 - 15.7 g/dL Final    Treponema Antibody Total 08/16/2024 Nonreactive  Nonreactive Final    Group B Strep PCR 08/16/2024 Positive (A)  Negative Final    ALERT: Streptococcus agalactiae (Group B Streptococcus) has a high rate of resistance to clindamycin. Therefore, clindamycin is not recommended for treatment unless susceptibility testing has been performed.    ABO/RH(D) 08/16/2024 O POS   Final    Antibody Screen 08/16/2024 Negative  Negative Final    SPECIMEN EXPIRATION DATE 08/16/2024 [id number]   Final      Discharge Medications:   See medication reconciliation.  Immunizations Administered for This Admission       Name Date Dose VIS Date Route    TDAP 8/17/24  2:14 PM 0.5 mL 08/06/2021, Given Today Intramuscular           Completed by:   Narseen Viramontes MD  [facility]  8/18/2024 7:30 AM   used: Not needed.

## 2024-08-18 NOTE — PROGRESS NOTES
D:  Patient desires discharge home.  Discharge orders received and entered by provider.  A:  Discharge instructions reviewed with the patient.  All questions and concerns addressed.  R:  Discharge criteria met.  4 Part ID bands double checked.   discharged in car seat with parents.  The nurse escorted patient to car .

## 2024-08-18 NOTE — PLAN OF CARE
Patient's vitals within normal parameters.   Patient denies any pain. She is ambulating well on her own and voiding without difficulty.   Patient reports difficulty with breastfeeding. RN/writer instructed patient to call for feeding assistance. Patient has been pumping and hand expressing and giving maternal expressed milk to  via spoon/finger.   Lochia appropriate and fundus firm, midline, and 1 below umbilicus.     Kimberli  offered for all patient interaction.       Goal Outcome Evaluation:      Plan of Care Reviewed With: patient    Overall Patient Progress: improvingOverall Patient Progress: improving       Dannielle Mercedes RN

## 2024-08-18 NOTE — DISCHARGE INSTRUCTIONS
Warning Signs after Having a Baby    Keep this paper on your fridge or somewhere else where you can see it.    Call your provider if you have any of these symptoms up to 12 weeks after having your baby.    Thoughts of hurting yourself or your baby  Pain in your chest or trouble breathing  Severe headache not helped by pain medicine  Eyesight concerns (blurry vision, seeing spots or flashes of light, other changes to eyesight)  Fainting, shaking or other signs of a seizure    Call 9-1-1 if you feel that it is an emergency.     The symptoms below can happen to anyone after giving birth. They can be very serious. Call your provider if you have any of these warning signs.    My provider s phone number: [phone]    Losing too much blood (hemorrhage)    Call your provider if you soak through a pad in less than an hour or pass blood clots bigger than a golf ball. These may be signs that you are bleeding too much.    Blood clots in the legs or lungs    After you give birth, your body naturally clots its blood to help prevent blood loss. Sometimes this increased clotting can happen in other areas of the body, like the legs or lungs. This can block your blood flow and be very dangerous.     Call your provider if you:  Have a red, swollen spot on the back of your leg that is warm or painful when you touch it.   Are coughing up blood.     Infection    Call your provider if you have any of these symptoms:  Fever of 100.4 F (38 C) or higher.  Pain or redness around your stitches if you had an incision.   Any yellow, white, or green fluid coming from places where you had stitches or surgery.    Mood Problems (postpartum depression)    Many people feel sad or have mood changes after having a baby. But for some people, these mood swings are worse.     Call your provider right away if you feel so anxious or nervous that you can't care for yourself or your baby.    Preeclampsia (high blood pressure)    Even if you didn't have high  blood pressure when you were pregnant, you are at risk for the high blood pressure disease called preeclampsia. This risk can last up to 12 weeks after giving birth.     Call your provider if you have:   Pain on your right side under your rib cage  Sudden swelling in the hands and face    Remember: You know your body. If something doesn't feel right, get medical help.     For informational purposes only. Not to replace the advice of your health care provider. Copyright 2020 [facility]. All rights reserved. Clinically reviewed by Jayne Stockton RNC-OB, MSN. [employer] 587666 - Rev 02/23.

## 2024-08-18 NOTE — PROGRESS NOTES
Patient hand expressed 5ml an spoon fed to baby.  Provided Medela breast pump for home use with rachel and teach back.

## 2024-08-19 ENCOUNTER — MEDICAL CORRESPONDENCE (OUTPATIENT)
Dept: HEALTH INFORMATION MANAGEMENT | Facility: CLINIC | Age: 24
End: 2024-08-19
Payer: COMMERCIAL

## 2024-08-21 ENCOUNTER — MEDICAL CORRESPONDENCE (OUTPATIENT)
Dept: HEALTH INFORMATION MANAGEMENT | Facility: CLINIC | Age: 24
End: 2024-08-21

## 2024-09-16 ENCOUNTER — TELEPHONE (OUTPATIENT)
Dept: FAMILY MEDICINE | Facility: CLINIC | Age: 24
End: 2024-09-16
Payer: COMMERCIAL

## 2024-09-16 NOTE — TELEPHONE ENCOUNTER
Forms/Letter Request    Type of form/letter: OTHER: Delivery confirmation request       Do we have the form/letter: Yes: Incoming rightfax bin for Dr. GORDON    Who is the form from? [employer] (if other please explain)    Where did/will the form come from? form was faxed in    When is form/letter needed by: 9/18/2024    How would you like the form/letter returned: Fax : [phone]

## 2024-09-25 ENCOUNTER — PRENATAL OFFICE VISIT (OUTPATIENT)
Dept: FAMILY MEDICINE | Facility: CLINIC | Age: 24
End: 2024-09-25
Payer: COMMERCIAL

## 2024-09-25 VITALS
OXYGEN SATURATION: 97 % | RESPIRATION RATE: 16 BRPM | HEIGHT: 61 IN | HEART RATE: 77 BPM | BODY MASS INDEX: 30.21 KG/M2 | SYSTOLIC BLOOD PRESSURE: 135 MMHG | DIASTOLIC BLOOD PRESSURE: 76 MMHG | WEIGHT: 160 LBS | TEMPERATURE: 98.1 F

## 2024-09-25 PROCEDURE — 90656 IIV3 VACC NO PRSV 0.5 ML IM: CPT | Performed by: FAMILY MEDICINE

## 2024-09-25 PROCEDURE — 90471 IMMUNIZATION ADMIN: CPT | Performed by: FAMILY MEDICINE

## 2024-09-25 PROCEDURE — 91320 SARSCV2 VAC 30MCG TRS-SUC IM: CPT | Performed by: FAMILY MEDICINE

## 2024-09-25 PROCEDURE — 99207 PR POST PARTUM EXAM: CPT | Performed by: FAMILY MEDICINE

## 2024-09-25 PROCEDURE — 90480 ADMN SARSCOV2 VAC 1/ONLY CMP: CPT | Performed by: FAMILY MEDICINE

## 2024-09-25 ASSESSMENT — EDINBURGH POSTNATAL DEPRESSION SCALE (EPDS)
I HAVE LOOKED FORWARD WITH ENJOYMENT TO THINGS: AS MUCH AS I EVER DID
I HAVE BLAMED MYSELF UNNECESSARILY WHEN THINGS WENT WRONG: NO, NEVER
I HAVE FELT SAD OR MISERABLE: NO, NOT AT ALL
TOTAL SCORE: 0
I HAVE BEEN ABLE TO LAUGH AND SEE THE FUNNY SIDE OF THINGS: AS MUCH AS I ALWAYS COULD
THE THOUGHT OF HARMING MYSELF HAS OCCURRED TO ME: NEVER
THINGS HAVE BEEN GETTING ON TOP OF ME: NO, I HAVE BEEN COPING AS WELL AS EVER
I HAVE BEEN SO UNHAPPY THAT I HAVE BEEN CRYING: NO, NEVER
I HAVE BEEN ANXIOUS OR WORRIED FOR NO GOOD REASON: NO, NOT AT ALL
I HAVE FELT SCARED OR PANICKY FOR NO GOOD REASON: NO, NOT AT ALL
I HAVE BEEN SO UNHAPPY THAT I HAVE HAD DIFFICULTY SLEEPING: NOT AT ALL

## 2024-09-25 NOTE — PROGRESS NOTES
SUBJECTIVE: Gume is here for a 6-week postpartum checkup.      Delivery date was 24. She had a  of a viable girl, 5 lb 7 oz, complicated by  delivery (36w5d).  Since delivery, she has been breast feeding.  She has No signs of infection, bleeding or other complications.  We discussed contraceptions and she has chosen Mirena IUD (will return at 10 weeks postpartum for placement).  Patient screened for postpartum depression and complaints are NEGATIVE.     Hoping to be off 10 weeks total from work.     EXAM:    GENERAL APPEARANCE: healthy, alert and no distress     MS: extremities normal- no gross deformities noted, no evidence of inflammation in joints, FROM in all extremities.     SKIN: no suspicious lesions or rashes     NEURO: Normal strength and tone, sensory exam grossly normal, mentation intact and speech normal     PSYCH: mentation appears normal. and affect normal/bright     LYMPHATICS: No cervical adenopathy    ASSESSMENT:   Normal postpartum exam after .    PLAN:  Return in 1 month for IUD insertion.     Hermelinda Oliva MD

## 2024-10-24 ENCOUNTER — OFFICE VISIT (OUTPATIENT)
Dept: FAMILY MEDICINE | Facility: CLINIC | Age: 24
End: 2024-10-24
Payer: COMMERCIAL

## 2024-10-24 VITALS
SYSTOLIC BLOOD PRESSURE: 98 MMHG | RESPIRATION RATE: 16 BRPM | OXYGEN SATURATION: 98 % | WEIGHT: 164 LBS | TEMPERATURE: 98.1 F | HEIGHT: 61 IN | BODY MASS INDEX: 30.96 KG/M2 | DIASTOLIC BLOOD PRESSURE: 60 MMHG | HEART RATE: 66 BPM

## 2024-10-24 DIAGNOSIS — K59.00 CONSTIPATION, UNSPECIFIED CONSTIPATION TYPE: ICD-10-CM

## 2024-10-24 DIAGNOSIS — Z30.430 ENCOUNTER FOR INSERTION OF INTRAUTERINE CONTRACEPTIVE DEVICE: Primary | ICD-10-CM

## 2024-10-24 DIAGNOSIS — K64.4 EXTERNAL HEMORRHOIDS: ICD-10-CM

## 2024-10-24 LAB — HCG UR QL: NEGATIVE

## 2024-10-24 PROCEDURE — 81025 URINE PREGNANCY TEST: CPT | Performed by: FAMILY MEDICINE

## 2024-10-24 PROCEDURE — 99213 OFFICE O/P EST LOW 20 MIN: CPT | Mod: 25 | Performed by: FAMILY MEDICINE

## 2024-10-24 PROCEDURE — T1013 SIGN LANG/ORAL INTERPRETER: HCPCS | Mod: U4

## 2024-10-24 PROCEDURE — 58300 INSERT INTRAUTERINE DEVICE: CPT | Performed by: FAMILY MEDICINE

## 2024-10-24 RX ORDER — POLYETHYLENE GLYCOL 3350 17 G/17G
1 POWDER, FOR SOLUTION ORAL DAILY
Qty: 510 G | Refills: 3 | Status: SHIPPED | OUTPATIENT
Start: 2024-10-24

## 2024-10-24 RX ORDER — LIDOCAINE 50 MG/G
OINTMENT TOPICAL PRN
Qty: 30 G | Refills: 1 | Status: SHIPPED | OUTPATIENT
Start: 2024-10-24

## 2024-10-24 RX ORDER — IBUPROFEN 200 MG
600 TABLET ORAL ONCE
Status: COMPLETED | OUTPATIENT
Start: 2024-10-24 | End: 2024-10-24

## 2024-10-24 RX ORDER — COPPER 313.4 MG/1
1 INTRAUTERINE DEVICE INTRAUTERINE ONCE
Status: COMPLETED
Start: 2024-10-24 | End: 2024-10-24

## 2024-10-24 RX ADMIN — Medication 600 MG: at 15:33

## 2024-10-24 RX ADMIN — COPPER 1 EACH: 313.4 INTRAUTERINE DEVICE INTRAUTERINE at 15:01

## 2024-10-24 NOTE — PROGRESS NOTES
SUBJECTIVE:  The patient desires long-term contraception and IUD placement     Paraguard IUD desired today. and Patient was given 600mg ibuprofen prior to procedure.      The patient understands the risks and benefits of intrauterine device insertion and agrees to proceed.     Also: hard stool, sometimes blood in stool and slight pain    ROS:    PHYSICAL EXAM:    Vulva: normal skin.  No lesions noted.  Nontender.    Vagina: normal appearance, physiologic discharge. No evidence of cystocele or rectocele.   Cervix: normal appearance, no lesions, no cervical motion tenderness   Uterus: normal size and position, mobile, non-tender   Mild external hemorrhoids noted, not thrombosed.      PROCEDURE AND FINDINGS: After appropriate discussion of risks and benefits of IUD placement, written informed consent was obtained.  Urine pregnancy test was negative.  Bimanual exam revealed the cervix to be slightly retroverted.The patient was placed in the dorsal lithotomy position, and a sterile speculum was inserted.  The cervix was visualized and prepped with iodine.    A tenaculum was applied to the anterior lip of the cervix.   The IUD was loaded in the applicator in the usual fashion and the indicator placed at 8 cm. The applicator was inserted into the cervix and the intrauterine device placed high in the endometrial cavity.  The applicator was withdrawn and the strings trimmed to aprroximately 2-3 cm.  The patient tolerated the procedure well with no complications.    CONCLUSIONS/FOLLOW-UP:    IUD Insertion:  ParaGard.  Patient instructed to check for strings monthly.  Return in 2 months for string check.     Donalds was seen today for contraception.    Diagnoses and all orders for this visit:    Encounter for insertion of intrauterine contraceptive device  -     ibuprofen (ADVIL/MOTRIN) tablet 600 mg  -     paragard intrauterine copper IUD device 1 each  -     INSERTION INTRAUTERINE DEVICE    Constipation, unspecified  constipation type: discussed managing constipation   -     lidocaine (XYLOCAINE) 5 % external ointment; Apply topically as needed for moderate pain.  -     polyethylene glycol (MIRALAX) 17 GM/Dose powder; Take 17 g (1 Capful) by mouth daily.    External hemorrhoids  -     lidocaine (XYLOCAINE) 5 % external ointment; Apply topically as needed for moderate pain.  -     polyethylene glycol (MIRALAX) 17 GM/Dose powder; Take 17 g (1 Capful) by mouth daily.    Other orders  -     REVIEW OF HEALTH MAINTENANCE PROTOCOL ORDERS  -     HCG qualitative urine; Future  -     HCG qualitative urine        Hermelinda Oliva MD

## 2024-10-29 ENCOUNTER — NURSE TRIAGE (OUTPATIENT)
Dept: FAMILY MEDICINE | Facility: CLINIC | Age: 24
End: 2024-10-29
Payer: COMMERCIAL

## 2024-10-29 ENCOUNTER — MYC MEDICAL ADVICE (OUTPATIENT)
Dept: FAMILY MEDICINE | Facility: CLINIC | Age: 24
End: 2024-10-29
Payer: COMMERCIAL

## 2024-10-29 NOTE — TELEPHONE ENCOUNTER
Writer huddled with Dr. Aguilar (end of day provider) Dr. Aguilar reviewed chart and recommended that patient be seen either today or tomorrow. If it is the worst headache in her life, she needs to be see in the ER.      Writer called patient and relayed message. Patient verbalized understanding. No openings today or tomorrow. Advised patient to go to a walk-in clinic or urgent care. Patient stated she will go to urgent care tomorrow as she does not have anyone to watch her kids today.      Lloyd Leonard, BSN RN  [facility]

## 2024-10-29 NOTE — TELEPHONE ENCOUNTER
Nurse Triage SBAR    Is this a 2nd Level Triage? YES, LICENSED PRACTITIONER REVIEW IS REQUIRED    Situation: Patient sent [employer] message stating she's been having headaches and dizziness since IUD placement.     Background: Paragard IUD placed on 10/24/24 by PCP here at [facility]. Patient stated she's had similar headache like this before when she had the Nexplanon placed but it was not this bad. Not currently pregnant. In-clinic pregnancy test done on 10/24/24 and was negative. No recent head injury. Never diagnosed in migraine headaches.     Assessment: Patient c/o severe left side headaches that comes and goes. Headache pain rating of 8-10/10. Headache started on Friday 10/25/24. It does go away with pain medications (Tylenol). Is having some dizziness along with the headaches. No fever. No other symptoms.     Protocol Recommended Disposition: Callback By PCP Within 1 Hour    Recommendation: Informed patient that will be huddling with covering provider today for further recommendation and we will contact patient back with provider recommendation within an hour. Patient verbalizes understanding of plan.        Does the patient meet one of the following criteria for ADS visit consideration? 16+ years old, with an [facility] PCP     TIP  Providers, please consider if this condition is appropriate for management at one of our Acute and Diagnostic Services sites.     If patient is a good candidate, please use dotphrase <dot>triageresponse and select Refer to ADS to document.    Reason for Disposition   SEVERE headache (e.g., excruciating) and has had severe headaches before    Additional Information   Negative: Difficult to awaken or acting confused (e.g., disoriented, slurred speech)   Negative: Weakness of the face, arm or leg on one side of the body and new-onset   Negative: Numbness of the face, arm or leg on one side of the body and new-onset   Negative: Loss of speech or garbled speech and new-onset   Negative:  "Passed out (i.e., fainted, collapsed and was not responding)   Negative: Sounds like a life-threatening emergency to the triager   Negative: Followed a head injury within last 3 days   Negative: Traumatic Brain Injury (TBI) is suspected   Negative: Sinus pain of forehead and yellow or green nasal discharge   Negative: Pregnant   Negative: Unable to walk without falling   Negative: Stiff neck (can't touch chin to chest)   Negative: Possibility of carbon monoxide exposure   Negative: SEVERE headache, states 'worst headache' of life   Negative: SEVERE headache, sudden-onset (i.e., reaching maximum intensity within seconds to 1 hour)   Negative: Severe pain in one eye   Negative: Loss of vision or double vision  (Exception: Same as prior migraines.)   Negative: Patient sounds very sick or weak to the triager   Negative: Fever > 103 F (39.4 C)   Negative: Fever > 100.0 F (37.8 C) and has diabetes mellitus or a weak immune system (e.g., HIV positive, cancer chemotherapy, organ transplant, splenectomy, chronic steroids)    Answer Assessment - Initial Assessment Questions  1. LOCATION: \"Where does it hurt?\"       Left side   2. ONSET: \"When did the headache start?\" (Minutes, hours or days)       10/25/24, on Friday   3. PATTERN: \"Does the pain come and go, or has it been constant since it started?\"      Comes and go, usually comes in the morning (2-3 hours) take Tylenol and then it goes away then comes back   4. SEVERITY: \"How bad is the pain?\" and \"What does it keep you from doing?\"  (e.g., Scale 1-10; mild, moderate, or severe)    - MILD (1-3): doesn't interfere with normal activities     - MODERATE (4-7): interferes with normal activities or awakens from sleep     - SEVERE (8-10): excruciating pain, unable to do any normal activities         Pain is severe when headache comes, has to sit still in one spot unable to move (8-10/10)  5. RECURRENT SYMPTOM: \"Have you ever had headaches before?\" If Yes, ask: \"When was the last " "time?\" and \"What happened that time?\"       No, it was similar to when she had the nexplanon placed   6. CAUSE: \"What do you think is causing the headache?\"      IUD placement   7. MIGRAINE: \"Have you been diagnosed with migraine headaches?\" If Yes, ask: \"Is this headache similar?\"       No   8. HEAD INJURY: \"Has there been any recent injury to the head?\"       No   9. OTHER SYMPTOMS: \"Do you have any other symptoms?\" (fever, stiff neck, eye pain, sore throat, cold symptoms)      Dizziness   10. PREGNANCY: \"Is there any chance you are pregnant?\" \"When was your last menstrual period?\"        No, clinic pregnancy test done on 10/24 was negative.    Protocols used: Headache-A-OH      Carol Sainz, MSN, RN   [facility]     "

## 2024-10-30 ENCOUNTER — OFFICE VISIT (OUTPATIENT)
Dept: URGENT CARE | Facility: URGENT CARE | Age: 24
End: 2024-10-30
Payer: COMMERCIAL

## 2024-10-30 VITALS
OXYGEN SATURATION: 97 % | TEMPERATURE: 98.3 F | RESPIRATION RATE: 20 BRPM | SYSTOLIC BLOOD PRESSURE: 117 MMHG | DIASTOLIC BLOOD PRESSURE: 82 MMHG | HEART RATE: 67 BPM

## 2024-10-30 DIAGNOSIS — G43.809 OTHER MIGRAINE WITHOUT STATUS MIGRAINOSUS, NOT INTRACTABLE: Primary | ICD-10-CM

## 2024-10-30 PROCEDURE — 99213 OFFICE O/P EST LOW 20 MIN: CPT

## 2024-10-30 RX ORDER — MECLIZINE HCL 12.5 MG 12.5 MG/1
12.5 TABLET ORAL 3 TIMES DAILY PRN
Qty: 20 TABLET | Refills: 0 | Status: SHIPPED | OUTPATIENT
Start: 2024-10-30

## 2024-10-30 NOTE — LETTER
October 30, 2024      Gume King  [address] [place] [address] [address]  [facility] [address]        To Whom It May Concern:    Gume King was seen in our clinic. Please excuse her  while he helped care for his wife.       Sincerely,      NITA Aguilar CNP

## 2024-10-30 NOTE — PATIENT INSTRUCTIONS
Diagnosis:  headache   Plan: ,   Try the medicine   Continue with the tylenol and ibuprofen   Cold packs, darkened room, sleeping, rest   Lots of fluids   Follow up - prevention med  These medications include beta blockers, calcium channel blockers, antidepressants, anticonvulsants, and hormones.  Prevention work to identify triggers to help to avoid headaches   Monitor for:   headache is more intense or lasts longer than usual  medication doesn't provide relief  severe vomiting  Fevers     Migraine Headache  Migraine headaches are a strong type of headache. They can occur at any age, but usually start between the ages of 10 and 30.    If you have a migraine once, you will likely have another one at some point.  The exact cause of migraine headaches is not known. But the tendency to get them can run in families.  Sometimes, certain symptoms will tell you when a migraine is about to begin. This might happen hours or days before your headache begins.   This warning sign is a change in visual perception. This is called an aura.   The most common type of aura is a visual disturbance, like flashes of light, flickering lights, or a blind spot.   An aura usually lasts 10 to 30 minutes. The headache often follows within an hour.  Once the migraine headache begins, it can last from 4 to 72 hours.   The pain is usually throbbing and affects one side of the head.   During a migraine, many people have nausea and vomiting and are very sensitive to light and sound.    migraines are usually caused by a trigger;  Keep track of your migraines.   Write down the things that seem to cause them. This can help you find triggers.  Possible triggers include:    stress and other emotions, fatigue, glaring or flickering lights, and weather changes.  Certain foods and drinks are often linked to migraine attacks.   These include chocolate and red wine and well as cheese, onions, fatty foods, and acidic foods like oranges and tomatoes.   Some  of these foods are rich in tyramine. This is an amino acid that has been linked to migraines.  Avoiding triggers may reduce your chances of getting a migraine

## 2024-10-30 NOTE — PROGRESS NOTES
URGENT CARE  Assessment & Plan   Assessment:   Gume King is a 24 year old female who's clinical presentation today is consistent with:   1. Migraine   - meclizine (ANTIVERT) 12.5 MG tablet;   Plan:  Given patient has already tried home remdies and conservative measures will treat patient's suspected migraine today with meclizine, patient is low risk for meningitis, and it is unlikely SAH, stroke or tumor, patient does not appear to need a MRI, CT or LP at this time.    We discussed if symptoms do not improve after starting today's treatment to follow up in 1-2 days, but sooner if symptoms worsen, return precautions given  Additionally encouraged patient to follow up with their PCP regarding their headaches; also discussed with patient other nonpharmacological treatment options such as: lifestyle changes, acupuncture, and identifying and avoiding triggers   Educated patient to monitor for progression to status migrainosus or the debilitating features of migraine attacks, and to present to ED if headache lasts >72 hours, educated patient to monitor for chest pain or difficulty breathing,  monitor for new numbness or weakness or changes to aura    No alarm signs or symptoms present   Differential Diagnoses for this patient's chief complaint that I considered include:   cluster headache, tension headache, sinus headache, temporal arteritis, ischemic event, brain mass/tumor, lesion, pseudotumor, meningitis, glaucoma, Neck trauma, hypertension, dehydration, infectious etiology      Patient is agreeable to treatment plan and state they will follow-up if symptoms do not improve and/or if symptoms worsen   see patient's AVS 'monitor for' section for specific patient instructions given and discussed regarding what to watch for and when to follow up    [facility] [place]      ______________________________________________________________________      Subjective     HPI: Gume King = is a  24 year old  female who presents today for evaluation the following concerns:   Patient presents today endorsing a headache which is located in the front, left aspect of their head . They describe the headache as: tension     Onset was a few days ago and the course is waxing and weaning, but worse today .   She has a history of headaches and this feels similar to previous episodes.    She notes associated nausea, but denies any vomiting, she endorses some mild  photophobia. Denies auras   she denies any: visual field loss, blurry vision, changes in odor sensation, visual changes, numbness, and weakness.   Patient also denies any associated balance difficulty, weakness, recent head trauma, fever/chills, or other neurologic symptoms.  Patient states they have tried at home: acetaminophen and ibuprofen without any improvement   Precipitating factors: had her IUD placed and headaches started after that, suspect hormonal per patient   Patient states there are no associated abnormal neurological symptoms such as  loss of balance, loss of vision or speech, numbness or weakness on review.    Patient denies stiff neck.    Patient denies there are any focal neurologic symptoms or signs or cognitive changes.      Review of Systems:  Pertinent review of systems as reflected in HPI, otherwise negative.     Objective    Physical Exam:  Vitals:    10/30/24 1237   BP: 117/82   Pulse: 67   Resp: 20   Temp: 98.3  F (36.8  C)   TempSrc: Tympanic   SpO2: 97%      General: Alert and oriented, no acute distress, Vital signs reviewed: afebrile,  normotensive  Psy/mental status: Cooperative, nonanxious  SKIN: Intact, no rashes  EYES:  PEERLA, extra ocular motor intact without} discomfort  Eyes tracking, convergence normal, normal head shape: normocephalic, scalp/head/face non-tender  palpation,   No nausea, no dizziness noted   Conjunctiva: Clear bilaterally, no injection or erythema present  EARS: TMs intact, translucent gray in color  with normal landmarks present no erythema  or bulging tympanic membrane   Canals are without swelling, however have a mild amount of cerumen, no impaction  NOSE:  mucosa moist               No frontal or maxillary sinus tenderness present bilaterally  MOUTH/THROAT: lips, tongue, & oral mucosa appear normal upon inspection                Posterior oropharynx is wnl   NECK: supple, has full range of motion with no meningeal signs              No lymphadenopathy present  LUNG: normal work of breathing, good respiratory effort without retractions, good air  movement, non labored, inspection reveals normal chest expansion w/  inspiration   Neuro:  motor,  cerebellar function, gait and coordination are all within normal  limits, no numbness noted, A&Ox4    ______________________________________________________________________    I explained my diagnostic considerations and recommendations to the patient, who voiced understanding and agreement with the treatment plan.   All questions were answered.   We discussed potential side effects, risks and benefits of any prescribed or recommended therapies, as well as expectations for response to treatments.  Please see AVS for any patient instructions & handouts given.   Patient was advised to contact the Nurse Care Line, their Primary Care provider, Urgent Care, or the Emergency Department if there are new or worsening symptoms, or call 911 for emergencies.

## 2024-12-10 ASSESSMENT — EDINBURGH POSTNATAL DEPRESSION SCALE (EPDS)
I HAVE BEEN ANXIOUS OR WORRIED FOR NO GOOD REASON: NO, NOT AT ALL
I HAVE LOOKED FORWARD WITH ENJOYMENT TO THINGS: AS MUCH AS I EVER DID
I HAVE BLAMED MYSELF UNNECESSARILY WHEN THINGS WENT WRONG: NO, NEVER
I HAVE FELT SCARED OR PANICKY FOR NO GOOD REASON: NO, NOT AT ALL
THINGS HAVE BEEN GETTING ON TOP OF ME: NO, I HAVE BEEN COPING AS WELL AS EVER
I HAVE FELT SAD OR MISERABLE: NO, NOT AT ALL
I HAVE BEEN SO UNHAPPY THAT I HAVE HAD DIFFICULTY SLEEPING: NOT AT ALL
TOTAL SCORE: 0
I HAVE BEEN ABLE TO LAUGH AND SEE THE FUNNY SIDE OF THINGS: AS MUCH AS I ALWAYS COULD
I HAVE BEEN SO UNHAPPY THAT I HAVE BEEN CRYING: NO, NEVER
THE THOUGHT OF HARMING MYSELF HAS OCCURRED TO ME: NEVER

## 2024-12-12 ENCOUNTER — OFFICE VISIT (OUTPATIENT)
Dept: FAMILY MEDICINE | Facility: CLINIC | Age: 24
End: 2024-12-12
Payer: COMMERCIAL

## 2024-12-12 VITALS
BODY MASS INDEX: 30.82 KG/M2 | SYSTOLIC BLOOD PRESSURE: 113 MMHG | WEIGHT: 163.25 LBS | TEMPERATURE: 98 F | HEART RATE: 71 BPM | RESPIRATION RATE: 16 BRPM | OXYGEN SATURATION: 98 % | HEIGHT: 61 IN | DIASTOLIC BLOOD PRESSURE: 77 MMHG

## 2024-12-12 DIAGNOSIS — Z97.5 IUD (INTRAUTERINE DEVICE) IN PLACE: Primary | ICD-10-CM

## 2024-12-12 PROCEDURE — 99213 OFFICE O/P EST LOW 20 MIN: CPT | Performed by: FAMILY MEDICINE

## 2024-12-12 PROCEDURE — G2211 COMPLEX E/M VISIT ADD ON: HCPCS | Performed by: FAMILY MEDICINE

## 2024-12-12 NOTE — PROGRESS NOTES
"  Assessment & Plan     IUD (intrauterine device) in place: could see strings. Explained checking for strings.       The longitudinal plan of care for the diagnosis(es)/condition(s) as documented were addressed during this visit. Due to the added complexity in care, I will continue to support Gume in the subsequent management and with ongoing continuity of care.        BMI  Estimated body mass index is 30.85 kg/m  as calculated from the following:    Height as of this encounter: 1.549 m (5' 1\").    Weight as of this encounter: 74 kg (163 lb 4 oz).             Subjective   [place] is a 24 year old, presenting for the following health issues:  IUD string check        12/12/2024     3:31 PM   Additional Questions   Roomed by Victorina KUMAR   Accompanied by Daughter     HPI     Had Paragard IUD placed on 10/24/24. She has been doing fine with it. Has not felt for the strings. Just had her period.     She is breastfeeding and is pumping at work twice per day.       Objective    /77   Pulse 71   Temp 98  F (36.7  C) (Oral)   Resp 16   Ht 1.549 m (5' 1\")   Wt 74 kg (163 lb 4 oz)   LMP 12/03/2023 (Exact Date)   SpO2 98%   BMI 30.85 kg/m    Body mass index is 30.85 kg/m .  Physical Exam   GENERAL: alert and no distress  Vulva: normal skin.  No lesions noted.  Nontender.    Vagina: normal appearance, physiologic discharge. No evidence of cystocele or rectocele.   Cervix: normal appearance, no lesions, no cervical motion tenderness. iUD strings visualized  MS: no gross musculoskeletal defects noted, no edema  SKIN: no suspicious lesions or rashes  NEURO: Normal strength and tone, mentation intact and speech normal  PSYCH: mentation appears normal, affect normal/bright            Signed Electronically by: Hermelinda Oliva MD    "
warm/dry

## 2025-02-19 ENCOUNTER — MEDICAL CORRESPONDENCE (OUTPATIENT)
Dept: HEALTH INFORMATION MANAGEMENT | Facility: CLINIC | Age: 25
End: 2025-02-19
Payer: COMMERCIAL

## 2025-02-22 ENCOUNTER — E-VISIT (OUTPATIENT)
Dept: FAMILY MEDICINE | Facility: CLINIC | Age: 25
End: 2025-02-22
Payer: COMMERCIAL

## 2025-02-22 DIAGNOSIS — N89.8 VAGINAL CYST: Primary | ICD-10-CM

## 2025-02-22 PROCEDURE — 99207 PR NON-BILLABLE SERV PER CHARTING: CPT | Performed by: FAMILY MEDICINE

## 2025-02-24 ENCOUNTER — TELEPHONE (OUTPATIENT)
Dept: FAMILY MEDICINE | Facility: CLINIC | Age: 25
End: 2025-02-24
Payer: COMMERCIAL

## 2025-02-24 NOTE — TELEPHONE ENCOUNTER
Please call Gume Moo and schedule for a visit with me for vaginal cysts. I can fit her in this week if she would like, or she should go to urgent care somewhere if that doesn't work.    Hermelinda Oliva MD

## 2025-02-25 NOTE — TELEPHONE ENCOUNTER
Spoke to patient using language line and schedule with Dr. Oliva tomorrow 2/26 db with patient whom is coming in at 8m with mom.    Mary Jo Qiu  2/25/2025  9:20 am    Done

## 2025-02-26 ENCOUNTER — OFFICE VISIT (OUTPATIENT)
Dept: FAMILY MEDICINE | Facility: CLINIC | Age: 25
End: 2025-02-26
Payer: COMMERCIAL

## 2025-02-26 VITALS
RESPIRATION RATE: 16 BRPM | TEMPERATURE: 97.1 F | OXYGEN SATURATION: 97 % | BODY MASS INDEX: 28.89 KG/M2 | DIASTOLIC BLOOD PRESSURE: 72 MMHG | WEIGHT: 153.04 LBS | HEART RATE: 84 BPM | HEIGHT: 61 IN | SYSTOLIC BLOOD PRESSURE: 112 MMHG

## 2025-02-26 DIAGNOSIS — N76.4 LABIAL ABSCESS: ICD-10-CM

## 2025-02-26 DIAGNOSIS — N90.7 LABIAL CYST: Primary | ICD-10-CM

## 2025-02-26 PROCEDURE — G2211 COMPLEX E/M VISIT ADD ON: HCPCS | Performed by: FAMILY MEDICINE

## 2025-02-26 PROCEDURE — 99213 OFFICE O/P EST LOW 20 MIN: CPT | Performed by: FAMILY MEDICINE

## 2025-02-26 PROCEDURE — 3074F SYST BP LT 130 MM HG: CPT | Performed by: FAMILY MEDICINE

## 2025-02-26 PROCEDURE — 3078F DIAST BP <80 MM HG: CPT | Performed by: FAMILY MEDICINE

## 2025-02-26 NOTE — H&P (VIEW-ONLY)
"  Assessment & Plan     Labial cyst    Labial abscess: Because this has been so recurrent and I think the abscess is due to an underlying cyst, I would like her to see OB/GYN to see if they can remove the cyst rather than just doing a simple I&D. Her abscess is not severe and she was able to get in with [facility] OB tomorrow.         BMI  Estimated body mass index is 28.92 kg/m  as calculated from the following:    Height as of this encounter: 1.549 m (5' 1\").    Weight as of this encounter: 69.4 kg (153 lb 0.6 oz).           Lul Dunaway is a 24 year old, presenting for the following health issues:  Vaginal Cyst  (Follow up - no concerns / questions )      2/26/2025     9:06 AM   Additional Questions   Roomed by JESSICA SUAREZ MA   Accompanied by HOMERO King is here for a labial abscess. Has had this in the same spot on her left labial multiple times (see below). Was diagnosed as a Bartholin's gland cyst but I do not think this is accurate, as the location today is not consistent with Barthlin's gland location. I have never seen the cyst but below is the history from chart review.     1/29/23, ER visit for Bartholin cyst drainage, reported to be \"3 cm left labial Bartholin's gland cyst.\"    5/23/23: ER visit for left-sided area of swelling and tenderness. No I&D was done but Bactrim was given and on 5/26/23, the area was drained at [place] by Dr. Nieves.     10/3/ 2023: I&D of same area at [facility].      12/29/2023. I&D of same area at [facility]     5/3/24: I&D of left labial cyst at 2:00 at [facility].       Objective    /72   Pulse 84   Temp 97.1  F (36.2  C) (Temporal)   Resp 16   Ht 1.549 m (5' 1\")   Wt 69.4 kg (153 lb 0.6 oz)   LMP 02/09/2025 (Exact Date)   SpO2 97%   BMI 28.92 kg/m    Body mass index is 28.92 kg/m .  Physical Exam   GENERAL: alert and no distress   (female): normal female external genitalia and left labia has area of swelling and fluctuance, about 2 cm " in diameter. Not surrounding erythema or warmth.   MS: no gross musculoskeletal defects noted, no edema  SKIN: no suspicious lesions or rashes  NEURO: Normal strength and tone, mentation intact and speech normal  PSYCH: mentation appears normal, affect normal/bright            Signed Electronically by: Hermelinda Oliva MD

## 2025-02-26 NOTE — PROGRESS NOTES
"  Assessment & Plan     Labial cyst    Labial abscess: Because this has been so recurrent and I think the abscess is due to an underlying cyst, I would like her to see OB/GYN to see if they can remove the cyst rather than just doing a simple I&D. Her abscess is not severe and she was able to get in with [facility] OB tomorrow.         BMI  Estimated body mass index is 28.92 kg/m  as calculated from the following:    Height as of this encounter: 1.549 m (5' 1\").    Weight as of this encounter: 69.4 kg (153 lb 0.6 oz).           Lul Dunaway is a 24 year old, presenting for the following health issues:  Vaginal Cyst  (Follow up - no concerns / questions )      2/26/2025     9:06 AM   Additional Questions   Roomed by JESSICA SUAREZ MA   Accompanied by HOMERO King is here for a labial abscess. Has had this in the same spot on her left labial multiple times (see below). Was diagnosed as a Bartholin's gland cyst but I do not think this is accurate, as the location today is not consistent with Barthlin's gland location. I have never seen the cyst but below is the history from chart review.     1/29/23, ER visit for Bartholin cyst drainage, reported to be \"3 cm left labial Bartholin's gland cyst.\"    5/23/23: ER visit for left-sided area of swelling and tenderness. No I&D was done but Bactrim was given and on 5/26/23, the area was drained at [place] by Dr. Nieves.     10/3/ 2023: I&D of same area at [facility].      12/29/2023. I&D of same area at [facility]     5/3/24: I&D of left labial cyst at 2:00 at [facility].       Objective    /72   Pulse 84   Temp 97.1  F (36.2  C) (Temporal)   Resp 16   Ht 1.549 m (5' 1\")   Wt 69.4 kg (153 lb 0.6 oz)   LMP 02/09/2025 (Exact Date)   SpO2 97%   BMI 28.92 kg/m    Body mass index is 28.92 kg/m .  Physical Exam   GENERAL: alert and no distress   (female): normal female external genitalia and left labia has area of swelling and fluctuance, about 2 cm " in diameter. Not surrounding erythema or warmth.   MS: no gross musculoskeletal defects noted, no edema  SKIN: no suspicious lesions or rashes  NEURO: Normal strength and tone, mentation intact and speech normal  PSYCH: mentation appears normal, affect normal/bright            Signed Electronically by: Hermelinda Oliva MD

## 2025-02-27 ENCOUNTER — LAB REQUISITION (OUTPATIENT)
Dept: LAB | Facility: CLINIC | Age: 25
End: 2025-02-27

## 2025-02-27 ENCOUNTER — TRANSFERRED RECORDS (OUTPATIENT)
Dept: HEALTH INFORMATION MANAGEMENT | Facility: CLINIC | Age: 25
End: 2025-02-27

## 2025-02-27 ENCOUNTER — LAB REQUISITION (OUTPATIENT)
Dept: LAB | Facility: CLINIC | Age: 25
End: 2025-02-27
Payer: COMMERCIAL

## 2025-02-27 DIAGNOSIS — Z11.3 ENCOUNTER FOR SCREENING FOR INFECTIONS WITH A PREDOMINANTLY SEXUAL MODE OF TRANSMISSION: ICD-10-CM

## 2025-02-27 DIAGNOSIS — N76.4 ABSCESS OF VULVA: ICD-10-CM

## 2025-02-27 PROCEDURE — 87070 CULTURE OTHR SPECIMN AEROBIC: CPT | Performed by: OBSTETRICS & GYNECOLOGY

## 2025-02-27 PROCEDURE — 87798 DETECT AGENT NOS DNA AMP: CPT | Mod: ORL | Performed by: OBSTETRICS & GYNECOLOGY

## 2025-02-28 ENCOUNTER — TRANSFERRED RECORDS (OUTPATIENT)
Dept: HEALTH INFORMATION MANAGEMENT | Facility: CLINIC | Age: 25
End: 2025-02-28
Payer: COMMERCIAL

## 2025-02-28 RX ORDER — SULFAMETHOXAZOLE AND TRIMETHOPRIM 800; 160 MG/1; MG/1
TABLET ORAL
COMMUNITY
Start: 2025-02-27

## 2025-03-02 LAB
BACTERIA ABSC ANAEROBE+AEROBE CULT: ABNORMAL
GRAM STAIN RESULT: ABNORMAL
M GENITALIUM DNA SPEC QL NAA+PROBE: NOT DETECTED
M HOMINIS DNA SPEC QL NAA+PROBE: NOT DETECTED
U PARVUM DNA SPEC QL NAA+PROBE: NOT DETECTED
U UREALYTICUM DNA SPEC QL NAA+PROBE: DETECTED

## 2025-03-03 ENCOUNTER — HOSPITAL ENCOUNTER (OUTPATIENT)
Facility: AMBULATORY SURGERY CENTER | Age: 25
Discharge: HOME | End: 2025-03-03
Attending: OBSTETRICS & GYNECOLOGY
Payer: COMMERCIAL

## 2025-03-03 ENCOUNTER — ANESTHESIA EVENT (OUTPATIENT)
Dept: SURGERY | Facility: AMBULATORY SURGERY CENTER | Age: 25
End: 2025-03-03
Payer: COMMERCIAL

## 2025-03-03 ENCOUNTER — ANESTHESIA (OUTPATIENT)
Dept: SURGERY | Facility: AMBULATORY SURGERY CENTER | Age: 25
End: 2025-03-03
Payer: COMMERCIAL

## 2025-03-03 VITALS
OXYGEN SATURATION: 100 % | BODY MASS INDEX: 28.89 KG/M2 | RESPIRATION RATE: 15 BRPM | HEART RATE: 69 BPM | TEMPERATURE: 96.3 F | SYSTOLIC BLOOD PRESSURE: 127 MMHG | WEIGHT: 153 LBS | DIASTOLIC BLOOD PRESSURE: 74 MMHG | HEIGHT: 61 IN

## 2025-03-03 DIAGNOSIS — N90.7 VULVAR CYST: ICD-10-CM

## 2025-03-03 LAB
HCG UR QL: NEGATIVE
INTERNAL QC OK POCT: NORMAL
POCT KIT EXPIRATION DATE: NORMAL
POCT KIT LOT NUMBER: NORMAL

## 2025-03-03 RX ORDER — PROPOFOL 10 MG/ML
INJECTION, EMULSION INTRAVENOUS CONTINUOUS PRN
Status: DISCONTINUED | OUTPATIENT
Start: 2025-03-03 | End: 2025-03-03

## 2025-03-03 RX ORDER — GINSENG 100 MG
CAPSULE ORAL PRN
Status: DISCONTINUED | OUTPATIENT
Start: 2025-03-03 | End: 2025-03-03 | Stop reason: HOSPADM

## 2025-03-03 RX ORDER — SODIUM CHLORIDE, SODIUM LACTATE, POTASSIUM CHLORIDE, CALCIUM CHLORIDE 600; 310; 30; 20 MG/100ML; MG/100ML; MG/100ML; MG/100ML
INJECTION, SOLUTION INTRAVENOUS CONTINUOUS
Status: DISCONTINUED | OUTPATIENT
Start: 2025-03-03 | End: 2025-03-04 | Stop reason: HOSPADM

## 2025-03-03 RX ORDER — FENTANYL CITRATE 50 UG/ML
INJECTION, SOLUTION INTRAMUSCULAR; INTRAVENOUS PRN
Status: DISCONTINUED | OUTPATIENT
Start: 2025-03-03 | End: 2025-03-03

## 2025-03-03 RX ORDER — OXYCODONE HYDROCHLORIDE 5 MG/1
5 TABLET ORAL
Status: COMPLETED | OUTPATIENT
Start: 2025-03-03 | End: 2025-03-03

## 2025-03-03 RX ORDER — KETOROLAC TROMETHAMINE 30 MG/ML
INJECTION, SOLUTION INTRAMUSCULAR; INTRAVENOUS PRN
Status: DISCONTINUED | OUTPATIENT
Start: 2025-03-03 | End: 2025-03-03

## 2025-03-03 RX ORDER — OXYCODONE HYDROCHLORIDE 10 MG/1
10 TABLET ORAL
Status: DISCONTINUED | OUTPATIENT
Start: 2025-03-03 | End: 2025-03-04 | Stop reason: HOSPADM

## 2025-03-03 RX ORDER — DEXAMETHASONE SODIUM PHOSPHATE 4 MG/ML
4 INJECTION, SOLUTION INTRA-ARTICULAR; INTRALESIONAL; INTRAMUSCULAR; INTRAVENOUS; SOFT TISSUE
Status: DISCONTINUED | OUTPATIENT
Start: 2025-03-03 | End: 2025-03-04 | Stop reason: HOSPADM

## 2025-03-03 RX ORDER — NALOXONE HYDROCHLORIDE 0.4 MG/ML
0.1 INJECTION, SOLUTION INTRAMUSCULAR; INTRAVENOUS; SUBCUTANEOUS
Status: DISCONTINUED | OUTPATIENT
Start: 2025-03-03 | End: 2025-03-04 | Stop reason: HOSPADM

## 2025-03-03 RX ORDER — ONDANSETRON 2 MG/ML
4 INJECTION INTRAMUSCULAR; INTRAVENOUS EVERY 30 MIN PRN
Status: DISCONTINUED | OUTPATIENT
Start: 2025-03-03 | End: 2025-03-04 | Stop reason: HOSPADM

## 2025-03-03 RX ORDER — ACETAMINOPHEN 325 MG/1
975 TABLET ORAL ONCE
Status: DISCONTINUED | OUTPATIENT
Start: 2025-03-03 | End: 2025-03-04 | Stop reason: HOSPADM

## 2025-03-03 RX ORDER — IBUPROFEN 800 MG/1
800 TABLET, FILM COATED ORAL ONCE
Status: DISCONTINUED | OUTPATIENT
Start: 2025-03-03 | End: 2025-03-04 | Stop reason: HOSPADM

## 2025-03-03 RX ORDER — ONDANSETRON 2 MG/ML
INJECTION INTRAMUSCULAR; INTRAVENOUS PRN
Status: DISCONTINUED | OUTPATIENT
Start: 2025-03-03 | End: 2025-03-03

## 2025-03-03 RX ORDER — DEXAMETHASONE SODIUM PHOSPHATE 4 MG/ML
INJECTION, SOLUTION INTRA-ARTICULAR; INTRALESIONAL; INTRAMUSCULAR; INTRAVENOUS; SOFT TISSUE PRN
Status: DISCONTINUED | OUTPATIENT
Start: 2025-03-03 | End: 2025-03-03

## 2025-03-03 RX ORDER — ONDANSETRON 4 MG/1
4 TABLET, ORALLY DISINTEGRATING ORAL EVERY 30 MIN PRN
Status: DISCONTINUED | OUTPATIENT
Start: 2025-03-03 | End: 2025-03-04 | Stop reason: HOSPADM

## 2025-03-03 RX ORDER — ACETAMINOPHEN 325 MG/1
975 TABLET ORAL ONCE
Status: COMPLETED | OUTPATIENT
Start: 2025-03-03 | End: 2025-03-03

## 2025-03-03 RX ORDER — LIDOCAINE 40 MG/G
CREAM TOPICAL
Status: DISCONTINUED | OUTPATIENT
Start: 2025-03-03 | End: 2025-03-04 | Stop reason: HOSPADM

## 2025-03-03 RX ORDER — BUPIVACAINE HYDROCHLORIDE AND EPINEPHRINE 2.5; 5 MG/ML; UG/ML
INJECTION, SOLUTION INFILTRATION; PERINEURAL PRN
Status: DISCONTINUED | OUTPATIENT
Start: 2025-03-03 | End: 2025-03-03 | Stop reason: HOSPADM

## 2025-03-03 RX ORDER — OXYCODONE HYDROCHLORIDE 5 MG/1
5 TABLET ORAL
Status: DISCONTINUED | OUTPATIENT
Start: 2025-03-03 | End: 2025-03-04 | Stop reason: HOSPADM

## 2025-03-03 RX ORDER — PROPOFOL 10 MG/ML
INJECTION, EMULSION INTRAVENOUS PRN
Status: DISCONTINUED | OUTPATIENT
Start: 2025-03-03 | End: 2025-03-03

## 2025-03-03 RX ADMIN — FENTANYL CITRATE 25 MCG: 50 INJECTION, SOLUTION INTRAMUSCULAR; INTRAVENOUS at 08:14

## 2025-03-03 RX ADMIN — SODIUM CHLORIDE, SODIUM LACTATE, POTASSIUM CHLORIDE, CALCIUM CHLORIDE: 600; 310; 30; 20 INJECTION, SOLUTION INTRAVENOUS at 07:41

## 2025-03-03 RX ADMIN — OXYCODONE HYDROCHLORIDE 5 MG: 5 TABLET ORAL at 09:27

## 2025-03-03 RX ADMIN — ACETAMINOPHEN 975 MG: 325 TABLET ORAL at 07:33

## 2025-03-03 RX ADMIN — PROPOFOL 30 MG: 10 INJECTION, EMULSION INTRAVENOUS at 08:06

## 2025-03-03 RX ADMIN — ONDANSETRON 4 MG: 2 INJECTION INTRAMUSCULAR; INTRAVENOUS at 08:11

## 2025-03-03 RX ADMIN — FENTANYL CITRATE 50 MCG: 50 INJECTION, SOLUTION INTRAMUSCULAR; INTRAVENOUS at 08:09

## 2025-03-03 RX ADMIN — FENTANYL CITRATE 25 MCG: 50 INJECTION, SOLUTION INTRAMUSCULAR; INTRAVENOUS at 08:25

## 2025-03-03 RX ADMIN — DEXAMETHASONE SODIUM PHOSPHATE 4 MG: 4 INJECTION, SOLUTION INTRA-ARTICULAR; INTRALESIONAL; INTRAMUSCULAR; INTRAVENOUS; SOFT TISSUE at 08:11

## 2025-03-03 RX ADMIN — KETOROLAC TROMETHAMINE 15 MG: 30 INJECTION, SOLUTION INTRAMUSCULAR; INTRAVENOUS at 08:37

## 2025-03-03 RX ADMIN — PROPOFOL 180 MCG/KG/MIN: 10 INJECTION, EMULSION INTRAVENOUS at 08:06

## 2025-03-03 NOTE — INTERVAL H&P NOTE
"I have reviewed the surgical (or preoperative) H&P that is linked to this encounter, and examined the patient. There are no significant changes  I also performed CV and Lung exam which were both normal on Thursday.     Clinical Conditions Present on Arrival:  Clinically Significant Risk Factors Present on Admission                       # Overweight: Estimated body mass index is 28.91 kg/m  as calculated from the following:    Height as of this encounter: 1.549 m (5' 1\").    Weight as of this encounter: 69.4 kg (153 lb).       "

## 2025-03-03 NOTE — ANESTHESIA CARE TRANSFER NOTE
Patient: Gume Moo    Procedure: Procedure(s):  EXCISION OF LEFT LABIAL ABSCESS       Diagnosis: Vulvar cyst [N90.7]  Diagnosis Additional Information: No value filed.    Anesthesia Type:   MAC     Note:    Oropharynx: oropharynx clear of all foreign objects and spontaneously breathing  Level of Consciousness: drowsy  Oxygen Supplementation: face mask  Level of Supplemental Oxygen (L/min / FiO2): 6  Independent Airway: airway patency satisfactory and stable  Dentition: dentition unchanged  Vital Signs Stable: post-procedure vital signs reviewed and stable  Report to RN Given: handoff report given  Patient transferred to: Phase II    Handoff Report: Identifed the Patient, Identified the Reponsible Provider, Reviewed the pertinent medical history, Discussed the surgical course, Reviewed Intra-OP anesthesia mangement and issues during anesthesia, Set expectations for post-procedure period and Allowed opportunity for questions and acknowledgement of understanding      Vitals:  Vitals Value Taken Time   BP 89/55 03/03/25 0843   Temp 35.7  C (96.3  F) 03/03/25 0843   Pulse 61 03/03/25 0844   Resp 17 03/03/25 0843   SpO2 100 % 03/03/25 0844   Vitals shown include unfiled device data.    Electronically Signed By: NITA Steward CRNA  March 3, 2025  8:46 AM

## 2025-03-03 NOTE — ANESTHESIA PREPROCEDURE EVALUATION
Anesthesia Pre-Procedure Evaluation    Patient: Gume King   MRN: [id number] : 2000        Procedure : Procedure(s):  EXCISION OF LEFT LABIAL ABSCESS          Past Medical History:   Diagnosis Date     Varicella       Past Surgical History:   Procedure Laterality Date     COLONOSCOPY        No Known Allergies   Social History     Tobacco Use     Smoking status: Never     Passive exposure: Current (father smokes outside)     Smokeless tobacco: Never   Substance Use Topics     Alcohol use: Never      Wt Readings from Last 1 Encounters:   25 69.4 kg (153 lb)        Anesthesia Evaluation            ROS/MED HX  ENT/Pulmonary:  - neg pulmonary ROS     Neurologic:  - neg neurologic ROS     Cardiovascular:  - neg cardiovascular ROS     METS/Exercise Tolerance:     Hematologic:  - neg hematologic  ROS     Musculoskeletal:  - neg musculoskeletal ROS     GI/Hepatic:  - neg GI/hepatic ROS     Renal/Genitourinary:  - neg Renal ROS     Endo:  - neg endo ROS     Psychiatric/Substance Use:  - neg psychiatric ROS     Infectious Disease:  - neg infectious disease ROS     Malignancy:  - neg malignancy ROS     Other:  - neg other ROS        Physical Exam    Airway  airway exam normal      Mallampati: II       Respiratory Devices and Support         Dental  no notable dental history         Cardiovascular   cardiovascular exam normal       Rhythm and rate: regular and normal     Pulmonary   pulmonary exam normal        breath sounds clear to auscultation       OUTSIDE LABS:  CBC:   Lab Results   Component Value Date    WBC 8.3 2024    WBC 10.4 2019    HGB 12.2 2024    HGB 11.0 (L) 2024    HCT 38.5 2024    HCT 32.1 (L) 2019     2024     2019     BMP:   Lab Results   Component Value Date     2018    POTASSIUM 4.5 2018    CHLORIDE 105 2018    CO2 24 2018    BUN 11 2018    CR 0.71 2018    GLC 80 2018     COAGS: No results  "found for: \"PTT\", \"INR\", \"FIBR\"  POC:   Lab Results   Component Value Date    HCG Negative 03/03/2025     HEPATIC:   Lab Results   Component Value Date    ALBUMIN 4.0 09/08/2018    PROTTOTAL 8.0 09/08/2018    ALT 13 09/08/2018    AST 24 09/08/2018    ALKPHOS 130 09/08/2018    BILITOTAL 0.7 09/08/2018     OTHER:   Lab Results   Component Value Date    A1C 5.4 02/12/2024    ANA 9.6 09/08/2018    LIPASE 12 09/08/2018    CRP <0.1 09/08/2018       Anesthesia Plan    ASA Status:  1    NPO Status:  NPO Appropriate    Anesthesia Type: MAC.   Induction: Intravenous, Propofol.   Maintenance: TIVA.        Consents    Anesthesia Plan(s) and associated risks, benefits, and realistic alternatives discussed. Questions answered and patient/representative(s) expressed understanding.     - Discussed:     - Discussed with:  Patient,       - Extended Intubation/Ventilatory Support Discussed: No.      - Patient is DNR/DNI Status: No     Use of blood products discussed: No .     Postoperative Care    Pain management: IV analgesics, Oral pain medications.   PONV prophylaxis: Ondansetron (or other 5HT-3), Dexamethasone or Solumedrol     Comments:    Other Comments: The patient understands and accepts the risks of MAC anesthesia including (but not limited to) nausea, vomiting, dizziness, and chipped teeth. I also discussed the possibility of conversion to GAETT/GALMA anesthesia which include hoarse voice, sore throat, and pinched lip or chipped teeth.  Versed/fent  propofol ggt  Decadron/zofran              Agustín Wallace MD    I have reviewed the pertinent notes and labs in the chart from the past 30 days and (re)examined the patient.  Any updates or changes from those notes are reflected in this note.    Clinically Significant Risk Factors Present on Admission                             # Overweight: Estimated body mass index is 28.91 kg/m  as calculated from the following:    Height as of this encounter: 1.549 m (5' 1\").    " Weight as of this encounter: 69.4 kg (153 lb).

## 2025-03-03 NOTE — DISCHARGE INSTRUCTIONS
If you have any questions or concerns regarding your procedure please contact Dr. Quick, her office number is [phone]            Adult Discharge Orders & Instructions     For 24 hours after surgery    Get plenty of rest.  A responsible adult must stay with you for at least 24 hours after you leave the hospital.   Do not drive or use heavy equipment.  If you have weakness or tingling, don't drive or use heavy equipment until this feeling goes away.  Do not drink alcohol.  Avoid strenuous or risky activities.  Ask for help when climbing stairs.   You may feel lightheaded.  IF so, sit for a few minutes before standing.  Have someone help you get up.   If you have nausea (feel sick to your stomach): Drink only clear liquids such as apple juice, ginger ale, broth or 7-Up.  Rest may also help.  Be sure to drink enough fluids.  Move to a regular diet as you feel able.  You may have a slight fever. Call the doctor if your fever is over 100 F (37.7 C) (taken under the tongue) or lasts longer than 24 hours.  You may have a dry mouth, a sore throat, muscle aches or trouble sleeping.  These should go away after 24 hours.  Do not make important or legal decisions.     Call your doctor for any of the followin.  Signs of infection (fever, growing tenderness at the surgery site, a large amount of drainage or bleeding, severe pain, foul-smelling drainage, redness, swelling).    2. It has been over 8 to 10 hours since surgery and you are still not able to urinate (pass water).    3.  Headache for over 24 hours.    4.  Numbness, tingling or weakness in your legs the day after surgery (if you had spinal anesthesia).          Based on the surgery/procedure that you had today, we do not expect that you will have any problems.  However, we want you to know what to do if you have pain, nausea, bleeding, or infection:    To control pain:  Take medicines your physician has prescribed or or over-the counter medicine he or she  advises.  Ice packs and periods of rest are often helpful.  For surgery on an arm or leg, raise it on a pillow to ease swelling.  If your pain is not managed with the above methods, contact your physician.    Copyright [employer], Licensed under [employer]    To control nausea:  Take anti-nausea medicine approved by your physician.  Drink clear liquids such as apple juice, ginger ale, broth or 7-Up. Be sure to drink enough fluids.  Move to a regular diet as you feel able.  Rest may also help.    Bleeding:  You may see a little blood on your dressing, about the size of a quarter in the first 24 hours.  If you see this, there is no reason to be alarmed.  However, if this continues to increase in size, apply pressure if able, and notify your physician.    Copyright [employer] Licensed under [employer]    Infection: Please contact your physician if you have any of the following signs:  redness, swelling, heat, increasing pain or foul-smelling drainage at your surgery site, fever or chills.         Discharge Instructions:    Take you medications as directed and follow up with you primary provider as scheduled.   You should expect to pass air from your rectum after the procedure.   Follow these care guidelines during your recovery for the next 24 hours.   If you have any questions or concerns please contact the provider that performed your procedure.     You were given medicine for sedation:  You have been given medicines during your procedure that might make you sleepy and weak. To prevent problems:    *Rest for the rest of the day after you are home. You should be back to your normal activity tomorrow.  *For the next 24 hours:   -Do not drink alcoholic beverages.   -Do not make any important decisions or sign any legal forms.   -Do not work around machinery or power equipment.     The medicines used for sedation may make you feel nauseated.   *Start with clear liquids, such as tea, jell-o, broth  and ginger ale. As you feel better you may add soft foods such as pudding and ice cream.   *When you no longer feel nauseated, you may try your normal diet.     You should be back to eating your normal after 24 hours.     Call if you have any of these problems:  *Fever of 101 degree F or 38 degrees C  *Bleeding from the rectum  *Black stool or blood in your bowel movements  *Nausea with vomiting that does not ease after a few hours.  *Abdominal pain or bloating  *Fainting            You have received 975 mg of Acetaminophen (Tylenol) at 7:33am. Please do not take an additional dose of Tylenol until after 1:33pm.     Do not exceed 4,000 mg of acetaminophen during a 24 hour period and keep in mind that acetaminophen can also be found in many over-the-counter cold medications as well as narcotics that may be given for pain.     You received a medication called Toradol (a stronger IV ibuprofen) at 8:37am. Do NOT take any Ibuprofen / Advil / Aleve / Naproxen or products containing Ibuprofen until 2:37pm or later.     Do not take more than 2000mg of NSAIDS, such as Advil and Ibuprofen, in 24hrs.      Apply ice to the incision site as needed for pain. Twenty minutes with the ice on and then twenty minutes with the ice off.

## 2025-03-03 NOTE — OP NOTE
OPERATIVE NOTE    Procedure date: 3/3/2025    Pre-procedure Dx: Left labial epidermal inclusion cyst, currently infected  Post- Procedure Dx: Same    Procedure:Left labial cystectomy    Surgeon: Raiza Quick MD    Anesthesia: MAC with local  EBL: 20 mL    Complications: None   Findings: Left superior labial abscess from superior area that had opened tracking down the labia and opening mid medial labia.    Specimens: Cyst wall    Indications: Gume King is a 24 year old  who presented with recurrent superior labial cyst with 3 attempts at prior drainage and recurrence in the same location each time. She presented to Dr. Oliva with recurrent superior labial inflammation and was referred to OBGYN. I started her on antibiotics and scheduled her for excision.     Procedure: The patient was moved onto the OR table in supine position. MAC anesthesia was administered. The patient was placed in dorsal lithotomy position with yellow fin stirrups. She was prepped and draped in the usual fashion. A time out was performed. The left labial cyst was identified and opened further with care to try to find planes of the abscess. The defect was thoroughly irrigated and suctioned. The defect was closed with 3-0 Vicryl continuously deep and 4-0 vicryl interrupted sutures superficially.    Raiza Quick MD   3/3/2025 7:58 AM

## 2025-03-03 NOTE — ANESTHESIA POSTPROCEDURE EVALUATION
Patient: Elora Moo    Procedure: Procedure(s):  EXCISION OF LEFT LABIAL ABSCESS       Anesthesia Type:  MAC    Note:  Disposition: Outpatient   Postop Pain Control: Uneventful            Sign Out: Well controlled pain   PONV: No   Neuro/Psych: Uneventful            Sign Out: Acceptable/Baseline neuro status   Airway/Respiratory: Uneventful            Sign Out: Acceptable/Baseline resp. status   CV/Hemodynamics: Uneventful            Sign Out: Acceptable CV status; No obvious hypovolemia; No obvious fluid overload   Other NRE:    DID A NON-ROUTINE EVENT OCCUR?        Last vitals:  Vitals Value Taken Time   /74 03/03/25 0951   Temp 96.3  F (35.7  C) 03/03/25 0843   Pulse 69 03/03/25 0951   Resp 15 03/03/25 0951   SpO2 100 % 03/03/25 0951       Electronically Signed By: Agustín Wallace MD  March 3, 2025  1:43 PM

## 2025-04-10 ENCOUNTER — OFFICE VISIT (OUTPATIENT)
Dept: FAMILY MEDICINE | Facility: CLINIC | Age: 25
End: 2025-04-10
Payer: COMMERCIAL

## 2025-04-10 VITALS
HEART RATE: 82 BPM | OXYGEN SATURATION: 95 % | RESPIRATION RATE: 15 BRPM | WEIGHT: 154.04 LBS | HEIGHT: 60 IN | DIASTOLIC BLOOD PRESSURE: 75 MMHG | TEMPERATURE: 98 F | BODY MASS INDEX: 30.24 KG/M2 | SYSTOLIC BLOOD PRESSURE: 113 MMHG

## 2025-04-10 DIAGNOSIS — Z83.3 FAMILY HISTORY OF DIABETES MELLITUS: ICD-10-CM

## 2025-04-10 DIAGNOSIS — Z00.00 ROUTINE GENERAL MEDICAL EXAMINATION AT A HEALTH CARE FACILITY: Primary | ICD-10-CM

## 2025-04-10 DIAGNOSIS — Z97.5 IUD (INTRAUTERINE DEVICE) IN PLACE: ICD-10-CM

## 2025-04-10 DIAGNOSIS — K64.4 EXTERNAL HEMORRHOIDS: ICD-10-CM

## 2025-04-10 PROBLEM — O09.90 HIGH-RISK PREGNANCY, UNSPECIFIED TRIMESTER: Status: RESOLVED | Noted: 2019-07-09 | Resolved: 2025-04-10

## 2025-04-10 LAB
CHOLEST SERPL-MCNC: 184 MG/DL
EST. AVERAGE GLUCOSE BLD GHB EST-MCNC: 111 MG/DL
FASTING STATUS PATIENT QL REPORTED: NO
HBA1C MFR BLD: 5.5 % (ref 0–5.6)
HDLC SERPL-MCNC: 58 MG/DL
LDLC SERPL CALC-MCNC: 106 MG/DL
NONHDLC SERPL-MCNC: 126 MG/DL
TRIGL SERPL-MCNC: 102 MG/DL

## 2025-04-10 RX ORDER — HYDROCORTISONE 25 MG/G
OINTMENT TOPICAL 2 TIMES DAILY
Qty: 60 G | Refills: 1 | Status: SHIPPED | OUTPATIENT
Start: 2025-04-10

## 2025-04-10 SDOH — HEALTH STABILITY: PHYSICAL HEALTH: ON AVERAGE, HOW MANY DAYS PER WEEK DO YOU ENGAGE IN MODERATE TO STRENUOUS EXERCISE (LIKE A BRISK WALK)?: 5 DAYS

## 2025-04-10 ASSESSMENT — SOCIAL DETERMINANTS OF HEALTH (SDOH): HOW OFTEN DO YOU GET TOGETHER WITH FRIENDS OR RELATIVES?: NEVER

## 2025-04-10 NOTE — PROGRESS NOTES
"Preventive Care Visit  [facility] LIVANALEXIS Oliva MD, Family Medicine  Apr 10, 2025      Assessment & Plan     Routine general medical examination at a health care facility  - Lipid Profile  - Hemoglobin A1c    Family history of diabetes mellitus: mom has Type 2 DM. Check a1c  - Hemoglobin A1c    External hemorrhoids: Discussed using hydrocortisone ointment for itching.  Her stool is soft right now, she reports, but I discussed the importance of keeping it soft so as not to worsen the hemorrhoids.  - hydrocortisone 2.5 % ointment  Dispense: 60 g; Refill: 1    IUD (intrauterine device) in place: doing well with Paragard, no concerns.       Patient has been advised of split billing requirements and indicates understanding: Yes        BMI  Estimated body mass index is 29.85 kg/m  as calculated from the following:    Height as of this encounter: 1.53 m (5' 0.24\").    Weight as of this encounter: 69.9 kg (154 lb 0.6 oz).       Counseling  Appropriate preventive services were addressed with this patient via screening, questionnaire, or discussion as appropriate for fall prevention, nutrition, physical activity, Tobacco-use cessation, social engagement, weight loss and cognition.  Checklist reviewing preventive services available has been given to the patient.  Reviewed patient's diet, addressing concerns and/or questions.   Patient is at risk for social isolation and has been provided with information about the benefit of social connection.   The patient was instructed to see the dentist every 6 months.         Discussed keeping stool soft so as not to worsen the hemorrhoids.   Lul Dunaway is a 25 year old, presenting for the following:  Physical        4/10/2025     8:43 AM   Additional Questions   Roomed by JACINTO ECHAVARRIA CMA   Accompanied by None          HPI    Noticing bleeding with stools and some anal itching. Started 4 months ago. She is not constipated, stool seems soft. Every time she has " a bowel movement, there is blood. It is only just outside the stool, not mixed in.     Had epidermal inclusion cyst removed from left labia recently by OB/GYN.     Advance Care Planning  Patient does not have a Health Care Directive: Discussed advance care planning with patient; however, patient declined at this time.      4/10/2025   General Health   How would you rate your overall physical health? Good   Feel stress (tense, anxious, or unable to sleep) Not at all         4/10/2025   Nutrition   Three or more servings of calcium each day? Yes   Diet: Regular (no restrictions)   How many servings of fruit and vegetables per day? 4 or more   How many sweetened beverages each day? 0-1         4/10/2025   Exercise   Days per week of moderate/strenous exercise 5 days         4/10/2025   Social Factors   Frequency of gathering with friends or relatives Never   Worry food won't last until get money to buy more No   Food not last or not have enough money for food? No   Do you have housing? (Housing is defined as stable permanent housing and does not include staying ouside in a car, in a tent, in an abandoned building, in an overnight shelter, or couch-surfing.) Yes   Are you worried about losing your housing? No   Lack of transportation? No   Unable to get utilities (heat,electricity)? No   (!) SOCIAL CONNECTIONS CONCERN      4/10/2025   Dental   Dentist two times every year? (!) NO             Today's PHQ-2 Score:       2/26/2025     9:04 AM   PHQ-2 ( 1999 [employer])   Q1: Little interest or pleasure in doing things 0   Q2: Feeling down, depressed or hopeless 0   PHQ-2 Score 0    Q1: Little interest or pleasure in doing things Not at all   Q2: Feeling down, depressed or hopeless Not at all   PHQ-2 Score 0       Patient-reported         4/10/2025   Substance Use   Alcohol more than 3/day or more than 7/wk No   Do you use any other substances recreationally? No     Social History     Tobacco Use    Smoking status: Never      "Passive exposure: Current (father smokes outside)    Smokeless tobacco: Never   Vaping Use    Vaping status: Never Used   Substance Use Topics    Alcohol use: Never    Drug use: Never           4/10/2025   STI Screening   New sexual partner(s) since last STI/HIV test? No     History of abnormal Pap smear: No - age 21-29 PAP every 3 years recommended        10/12/2023     2:46 PM   PAP / HPV   PAP Negative for Intraepithelial Lesion or Malignancy (NILM)            4/10/2025   Contraception/Family Planning   Questions about contraception or family planning No        Reviewed and updated as needed this visit by Provider                    Past Medical History:   Diagnosis Date    Varicella          Review of Systems  Constitutional, HEENT, cardiovascular, pulmonary, gi and gu systems are negative, except as otherwise noted.     Objective    Exam  /75 (BP Location: Right arm, Patient Position: Sitting, Cuff Size: Adult Regular)   Pulse 82   Temp 98  F (36.7  C) (Oral)   Resp 15   Ht 1.53 m (5' 0.24\")   Wt 69.9 kg (154 lb 0.6 oz)   LMP 03/20/2025 (Exact Date)   SpO2 95%   BMI 29.85 kg/m     Estimated body mass index is 29.85 kg/m  as calculated from the following:    Height as of this encounter: 1.53 m (5' 0.24\").    Weight as of this encounter: 69.9 kg (154 lb 0.6 oz).    Physical Exam  GENERAL: alert and no distress  EYES: Eyes grossly normal to inspection, PERRL and conjunctivae and sclerae normal  HENT: ear canals and TM's normal, nose and mouth without ulcers or lesions  NECK: no adenopathy, no asymmetry, masses, or scars  RESP: lungs clear to auscultation - no rales, rhonchi or wheezes  CV: regular rate and rhythm, normal S1 S2, no S3 or S4, no murmur, click or rub, no peripheral edema  ABDOMEN: soft, nontender, no hepatosplenomegaly, no masses and bowel sounds normal  RECTAL: small external hemorrhoid, pink and slightly tender on palpation. No dermatitis, no anal fissure.   MS: no gross " musculoskeletal defects noted, no edema  SKIN: no suspicious lesions or rashes  NEURO: Normal strength and tone, mentation intact and speech normal  PSYCH: mentation appears normal, affect normal/bright        Signed Electronically by: Hermelinda Oliva MD

## 2025-04-10 NOTE — PATIENT INSTRUCTIONS
Patient Education   You have been provided the Preventive Care Advice document.    Additional copies can be found here: [email or web address]  Preventive Care Advice   This is general advice given by our system to help you stay healthy. However, your care team may have specific advice just for you. Please talk to your care team about your preventive care needs.  Nutrition  Eat 5 or more servings of fruits and vegetables each day.  Try wheat bread, brown rice and whole grain pasta (instead of white bread, rice, and pasta).  Get enough calcium and vitamin D. Check the label on foods and aim for 100% of the RDA (recommended daily allowance).  Lifestyle  Exercise at least 150 minutes each week  (30 minutes a day, 5 days a week).  Do muscle strengthening activities 2 days a week. These help control your weight and prevent disease.  No smoking.  Wear sunscreen to prevent skin cancer.  Have a dental exam and cleaning every 6 months.  Yearly exams  See your health care team every year to talk about:  Any changes in your health.  Any medicines your care team has prescribed.  Preventive care, family planning, and ways to prevent chronic diseases.  Shots (vaccines)   HPV shots (up to age 26), if you've never had them before.  Hepatitis B shots (up to age 59), if you've never had them before.  COVID-19 shot: Get this shot when it's due.  Flu shot: Get a flu shot every year.  Tetanus shot: Get a tetanus shot every 10 years.  Pneumococcal, hepatitis A, and RSV shots: Ask your care team if you need these based on your risk.  Shingles shot (for age 50 and up)  General health tests  Diabetes screening:  Starting at age 35, Get screened for diabetes at least every 3 years.  If you are younger than age 35, ask your care team if you should be screened for diabetes.  Cholesterol test: At age 39, start having a cholesterol test every 5 years, or more often if advised.  Bone density scan (DEXA): At age 50, ask your care team if you  should have this scan for osteoporosis (brittle bones).  Hepatitis C: Get tested at least once in your life.  STIs (sexually transmitted infections)  Before age 24: Ask your care team if you should be screened for STIs.  After age 24: Get screened for STIs if you're at risk. You are at risk for STIs (including HIV) if:  You are sexually active with more than one person.  You don't use condoms every time.  You or a partner was diagnosed with a sexually transmitted infection.  If you are at risk for HIV, ask about PrEP medicine to prevent HIV.  Get tested for HIV at least once in your life, whether you are at risk for HIV or not.  Cancer screening tests  Cervical cancer screening: If you have a cervix, begin getting regular cervical cancer screening tests starting at age 21.  Breast cancer scan (mammogram): If you've ever had breasts, begin having regular mammograms starting at age 40. This is a scan to check for breast cancer.  Colon cancer screening: It is important to start screening for colon cancer at age 45.  Have a colonoscopy test every 10 years (or more often if you're at risk) Or, ask your provider about stool tests like a FIT test every year or Cologuard test every 3 years.  To learn more about your testing options, visit:   .  For help making a decision, visit:   https://bit.ly/nb08952.  Prostate cancer screening test: If you have a prostate, ask your care team if a prostate cancer screening test (PSA) at age 55 is right for you.  Lung cancer screening: If you are a current or former smoker ages 50 to 80, ask your care team if ongoing lung cancer screenings are right for you.  For informational purposes only. Not to replace the advice of your health care provider. Copyright   2023 [employer]. All rights reserved. Clinically reviewed by the [facility]itions Program. [employer] [id number] - REV 01/24.  Relationships for Good Health  Relationships are important for our health and  happiness. Social isolation, loneliness and lack of support are bad for your health. Studies show that loneliness can harm health and limit your life span as much as high blood pressure and smoking.   Take some time to reflect on your relationships. Then answer these questions:  Are there people in your life that cause you stress or drain your energy? What can you do to set limits?  ________________________________________________________________________________________________________________________________________________________________________________________________________________________________________________________________________________________________________________________________________________  Who do you enjoy spending time with? Who can you go to for support?  ________________________________________________________________________________________________________________________________________________________________________________________________________________________________________________________________________________________________________________________________________________  What can you do to improve your relationships with others?  __________________________________________________________________________________________________________________________________________________________________________________________________________________  ______________________________________________________________________________________________________________________________  What do you like most about your relationships with others?  ________________________________________________________________________________________________________________________________________________________________________________________________________________________________________________________________________________________________________________________________________________  My goal:  ______________________________________________________________________  I will: ______________________________________________________________________________________________________________________________________________________________________________________________    For informational purposes only. Not to replace the advice of your health care provider. Copyright   2018 [facility]. All rights reserved. Clinically reviewed by [facility]  Team. Malini [id number] - Rev 06/24.

## 2025-06-04 ENCOUNTER — E-VISIT (OUTPATIENT)
Dept: FAMILY MEDICINE | Facility: CLINIC | Age: 25
End: 2025-06-04
Payer: COMMERCIAL

## 2025-06-04 DIAGNOSIS — B37.31 CANDIDAL VULVOVAGINITIS: Primary | ICD-10-CM

## 2025-06-04 PROCEDURE — 99207 PR NON-BILLABLE SERV PER CHARTING: CPT | Performed by: FAMILY MEDICINE

## 2025-06-04 RX ORDER — FLUCONAZOLE 150 MG/1
150 TABLET ORAL ONCE
Qty: 1 TABLET | Refills: 0 | Status: SHIPPED | OUTPATIENT
Start: 2025-06-04 | End: 2025-06-04

## 2025-06-10 ENCOUNTER — E-VISIT (OUTPATIENT)
Dept: FAMILY MEDICINE | Facility: CLINIC | Age: 25
End: 2025-06-10
Payer: COMMERCIAL

## 2025-06-10 DIAGNOSIS — N90.7 LABIAL CYST: Primary | ICD-10-CM

## 2025-06-10 PROCEDURE — 99207 PR NON-BILLABLE SERV PER CHARTING: CPT | Performed by: FAMILY MEDICINE

## 2025-06-11 ENCOUNTER — APPOINTMENT (OUTPATIENT)
Dept: INTERPRETER SERVICES | Facility: CLINIC | Age: 25
End: 2025-06-11
Payer: COMMERCIAL

## 2025-06-11 ENCOUNTER — TELEPHONE (OUTPATIENT)
Dept: FAMILY MEDICINE | Facility: CLINIC | Age: 25
End: 2025-06-11

## 2025-06-11 NOTE — TELEPHONE ENCOUNTER
Please call Gume King. She needs to get in with Dr. Quick at [facility] (or another doctor if Abdelrahman is not available). She has a recurrent labial cyst. She tried to call them herself but could not get connected.    Hermelinda Oliva MD

## 2025-06-11 NOTE — TELEPHONE ENCOUNTER
"Writer attempt #1 to call patient with the help of an [facility] \"Kimberli\"   regarding clinician's message below. Clinician's message relayed to patient.    Per patient, \"I tried to contact them, but never got a call back with an . I need help scheduling an appointment with them.\"    Routing message to [facility] team to assist with scheduling.    FAHEEM MurphyN, RN, PHN   [facility]      "

## 2025-06-12 NOTE — TELEPHONE ENCOUNTER
Spoke to patient and assisted with scheduling.     Dr. Paez  06/13/2025 8:15 am    [facility]   [address]  [place] [address]  [phone]    Dr. Quick is booking to end of July.   Patient confirms she has her own transportation.

## 2025-06-13 ENCOUNTER — HOSPITAL ENCOUNTER (EMERGENCY)
Facility: HOSPITAL | Age: 25
Discharge: HOME | End: 2025-06-13
Admitting: EMERGENCY MEDICINE
Payer: COMMERCIAL

## 2025-06-13 ENCOUNTER — APPOINTMENT (OUTPATIENT)
Dept: CT IMAGING | Facility: HOSPITAL | Age: 25
End: 2025-06-13
Attending: EMERGENCY MEDICINE
Payer: COMMERCIAL

## 2025-06-13 VITALS
RESPIRATION RATE: 20 BRPM | BODY MASS INDEX: 29.84 KG/M2 | TEMPERATURE: 98.2 F | WEIGHT: 154 LBS | OXYGEN SATURATION: 95 % | SYSTOLIC BLOOD PRESSURE: 93 MMHG | DIASTOLIC BLOOD PRESSURE: 55 MMHG | HEART RATE: 76 BPM

## 2025-06-13 DIAGNOSIS — N76.4 VULVAR ABSCESS: ICD-10-CM

## 2025-06-13 LAB
ANION GAP SERPL CALCULATED.3IONS-SCNC: 14 MMOL/L (ref 7–15)
BASOPHILS # BLD AUTO: 0 10E3/UL (ref 0–0.2)
BASOPHILS NFR BLD AUTO: 0 %
BUN SERPL-MCNC: 9.4 MG/DL (ref 6–20)
CALCIUM SERPL-MCNC: 9.2 MG/DL (ref 8.8–10.4)
CHLORIDE SERPL-SCNC: 101 MMOL/L (ref 98–107)
CREAT SERPL-MCNC: 0.54 MG/DL (ref 0.51–0.95)
CRP SERPL-MCNC: 26.5 MG/L
EGFRCR SERPLBLD CKD-EPI 2021: >90 ML/MIN/1.73M2
EOSINOPHIL # BLD AUTO: 0 10E3/UL (ref 0–0.7)
EOSINOPHIL NFR BLD AUTO: 0 %
ERYTHROCYTE [DISTWIDTH] IN BLOOD BY AUTOMATED COUNT: 14.6 % (ref 10–15)
GLUCOSE SERPL-MCNC: 83 MG/DL (ref 70–99)
HCG SERPL QL: NEGATIVE
HCO3 SERPL-SCNC: 20 MMOL/L (ref 22–29)
HCT VFR BLD AUTO: 43.7 % (ref 35–47)
HGB BLD-MCNC: 13.9 G/DL (ref 11.7–15.7)
IMM GRANULOCYTES # BLD: 0.1 10E3/UL
IMM GRANULOCYTES NFR BLD: 0 %
LACTATE SERPL-SCNC: 0.8 MMOL/L (ref 0.7–2)
LYMPHOCYTES # BLD AUTO: 1.5 10E3/UL (ref 0.8–5.3)
LYMPHOCYTES NFR BLD AUTO: 11 %
MCH RBC QN AUTO: 24.6 PG (ref 26.5–33)
MCHC RBC AUTO-ENTMCNC: 31.8 G/DL (ref 31.5–36.5)
MCV RBC AUTO: 77 FL (ref 78–100)
MONOCYTES # BLD AUTO: 0.7 10E3/UL (ref 0–1.3)
MONOCYTES NFR BLD AUTO: 5 %
NEUTROPHILS # BLD AUTO: 11.6 10E3/UL (ref 1.6–8.3)
NEUTROPHILS NFR BLD AUTO: 84 %
NRBC # BLD AUTO: 0 10E3/UL
NRBC BLD AUTO-RTO: 0 /100
PLATELET # BLD AUTO: 344 10E3/UL (ref 150–450)
POTASSIUM SERPL-SCNC: 3.9 MMOL/L (ref 3.4–5.3)
RBC # BLD AUTO: 5.66 10E6/UL (ref 3.8–5.2)
SODIUM SERPL-SCNC: 135 MMOL/L (ref 135–145)
WBC # BLD AUTO: 13.8 10E3/UL (ref 4–11)

## 2025-06-13 PROCEDURE — 250N000011 HC RX IP 250 OP 636: Performed by: EMERGENCY MEDICINE

## 2025-06-13 PROCEDURE — 83605 ASSAY OF LACTIC ACID: CPT | Performed by: EMERGENCY MEDICINE

## 2025-06-13 PROCEDURE — 96376 TX/PRO/DX INJ SAME DRUG ADON: CPT

## 2025-06-13 PROCEDURE — 85025 COMPLETE CBC W/AUTO DIFF WBC: CPT | Performed by: EMERGENCY MEDICINE

## 2025-06-13 PROCEDURE — 86140 C-REACTIVE PROTEIN: CPT | Performed by: EMERGENCY MEDICINE

## 2025-06-13 PROCEDURE — 56405 I&D VULVA/PERINEAL ABSCESS: CPT

## 2025-06-13 PROCEDURE — 250N000011 HC RX IP 250 OP 636: Mod: JZ | Performed by: EMERGENCY MEDICINE

## 2025-06-13 PROCEDURE — 84703 CHORIONIC GONADOTROPIN ASSAY: CPT | Performed by: EMERGENCY MEDICINE

## 2025-06-13 PROCEDURE — 74177 CT ABD & PELVIS W/CONTRAST: CPT

## 2025-06-13 PROCEDURE — 99285 EMERGENCY DEPT VISIT HI MDM: CPT | Mod: 25

## 2025-06-13 PROCEDURE — 96375 TX/PRO/DX INJ NEW DRUG ADDON: CPT

## 2025-06-13 PROCEDURE — 36415 COLL VENOUS BLD VENIPUNCTURE: CPT | Performed by: EMERGENCY MEDICINE

## 2025-06-13 PROCEDURE — 80048 BASIC METABOLIC PNL TOTAL CA: CPT | Performed by: EMERGENCY MEDICINE

## 2025-06-13 PROCEDURE — 96374 THER/PROPH/DIAG INJ IV PUSH: CPT | Mod: 59

## 2025-06-13 RX ORDER — KETOROLAC TROMETHAMINE 15 MG/ML
15 INJECTION, SOLUTION INTRAMUSCULAR; INTRAVENOUS ONCE
Status: COMPLETED | OUTPATIENT
Start: 2025-06-13 | End: 2025-06-13

## 2025-06-13 RX ORDER — HYDROMORPHONE HYDROCHLORIDE 1 MG/ML
0.5 INJECTION, SOLUTION INTRAMUSCULAR; INTRAVENOUS; SUBCUTANEOUS
Status: COMPLETED | OUTPATIENT
Start: 2025-06-13 | End: 2025-06-13

## 2025-06-13 RX ORDER — IOPAMIDOL 755 MG/ML
76 INJECTION, SOLUTION INTRAVASCULAR ONCE
Status: COMPLETED | OUTPATIENT
Start: 2025-06-13 | End: 2025-06-13

## 2025-06-13 RX ORDER — OXYCODONE HYDROCHLORIDE 5 MG/1
5 TABLET ORAL EVERY 6 HOURS PRN
Qty: 12 TABLET | Refills: 0 | Status: SHIPPED | OUTPATIENT
Start: 2025-06-13 | End: 2025-06-16

## 2025-06-13 RX ORDER — HYDROMORPHONE HYDROCHLORIDE 1 MG/ML
0.5 INJECTION, SOLUTION INTRAMUSCULAR; INTRAVENOUS; SUBCUTANEOUS ONCE
Refills: 0 | Status: COMPLETED | OUTPATIENT
Start: 2025-06-13 | End: 2025-06-13

## 2025-06-13 RX ADMIN — HYDROMORPHONE HYDROCHLORIDE 0.5 MG: 1 INJECTION, SOLUTION INTRAMUSCULAR; INTRAVENOUS; SUBCUTANEOUS at 11:27

## 2025-06-13 RX ADMIN — IOPAMIDOL 76 ML: 755 INJECTION, SOLUTION INTRAVENOUS at 10:38

## 2025-06-13 RX ADMIN — HYDROMORPHONE HYDROCHLORIDE 0.5 MG: 1 INJECTION, SOLUTION INTRAMUSCULAR; INTRAVENOUS; SUBCUTANEOUS at 09:23

## 2025-06-13 RX ADMIN — KETOROLAC TROMETHAMINE 15 MG: 15 INJECTION, SOLUTION INTRAMUSCULAR; INTRAVENOUS at 13:11

## 2025-06-13 ASSESSMENT — ACTIVITIES OF DAILY LIVING (ADL)
ADLS_ACUITY_SCORE: 52

## 2025-06-13 ASSESSMENT — ENCOUNTER SYMPTOMS
HEMATURIA: 0
VOMITING: 0
DYSURIA: 0
FEVER: 0
NAUSEA: 0
ABDOMINAL PAIN: 0
CHILLS: 0

## 2025-06-13 ASSESSMENT — COLUMBIA-SUICIDE SEVERITY RATING SCALE - C-SSRS
1. IN THE PAST MONTH, HAVE YOU WISHED YOU WERE DEAD OR WISHED YOU COULD GO TO SLEEP AND NOT WAKE UP?: NO
2. HAVE YOU ACTUALLY HAD ANY THOUGHTS OF KILLING YOURSELF IN THE PAST MONTH?: NO
6. HAVE YOU EVER DONE ANYTHING, STARTED TO DO ANYTHING, OR PREPARED TO DO ANYTHING TO END YOUR LIFE?: NO

## 2025-06-13 NOTE — ED NOTES
Patient discharged at 1325 to home; family providing transportation. Patient provided with all discharge paperwork and educational material. All questions answered to patient's satisfaction.

## 2025-06-13 NOTE — DISCHARGE INSTRUCTIONS
You are seen here for evaluation of a painful skin lesion.  This was consistent with an abscess/infection and was drained here in the emergency department.    Take the antibiotics that you were prescribed in the gynecology clinic.  Start immediately.    You may take Tylenol and ibuprofen for pain/fever, do not exceed 4000 mg of Tylenol per day or 3200 mg ofibuprofen per day.  Take oxycodone for severe pain- This is a narcotic pain medication that might be habit forming so only take when necessary. Do not drink alcohol,drive, or operate machinery while taking this medication. This medication might make you constipated so take Miralax over the counter to prevent this.     Sit in warm bath water a few times daily for swelling/drainage.     Follow up with Dr. Nichols in GYN clinic on Monday. Return here for any new or worsening symptoms including severe pain, fever, persistent vomiting, worsening redness or swelling surrounding the lesion, or any other symptoms that concern you.

## 2025-06-13 NOTE — CONSULTS
CONSULTATION - GYN    NAME: Gume King   : 2000   MRN: [id number]      [facility]    ADMISSION DATE: 2025    PCP:  Hermelinda Oliva     CHIEF COMPLAINT: vulvar cyst    HPI: I have been requested by Alycia Cosby to evaluate Gume King for left vulvar abscess. She was seen at [facility], Dr. Melly Nichols, earlier today. Given her pain, she was sent to the ER for IV pain medication and possible I&D of abscess. Patient is a 25 year old  who has h/o vulvar cysts who presents with 3 day h/o vulvar swelling and pain. Video  was used. History is obtained through the patient and chart review. She has h/o I&D of a vulvar abscess in 10/23, , . She presented to her PCP, Dr. Oliva in 3/25 with recurrent vulvar abscess and was referred to [facility]. She was taken to the OR by Dr. Quick in 3/25 for left labial cystectomy. Op note reviewed and final pathology showed benign labial tissue with abscess formation associated with marked acute and chronic inflammation      PAST MEDICAL HISTORY:  Past Medical History:   Diagnosis Date    Varicella        PAST SURGICAL HISTORY:  Past Surgical History:   Procedure Laterality Date    COLONOSCOPY      EXCISE MASS VULVA Left 3/3/2025    Procedure: EXCISION OF LEFT LABIAL ABSCESS;  Surgeon: Alana Quick MD;  Location: [facility]       SOCIAL HISTORY:  Social History     Socioeconomic History    Marital status: Single     Spouse name: Not on file    Number of children: Not on file    Years of education: Not on file    Highest education level: Not on file   Occupational History    Not on file   Tobacco Use    Smoking status: Never     Passive exposure: Current (father smokes outside)    Smokeless tobacco: Never   Vaping Use    Vaping status: Never Used   Substance and Sexual Activity    Alcohol use: Never    Drug use: Never    Sexual activity: Yes     Partners: Male     Birth control/protection: None   Other Topics Concern     Not on file   Social History Narrative    9/8/18: Presents to the ED with mother and father.      Social Drivers of Health     Financial Resource Strain: Low Risk  (4/10/2025)    Financial Resource Strain     Within the past 12 months, have you or your family members you live with been unable to get utilities (heat, electricity) when it was really needed?: No   Food Insecurity: Low Risk  (4/10/2025)    Food Insecurity     Within the past 12 months, did you worry that your food would run out before you got money to buy more?: No     Within the past 12 months, did the food you bought just not last and you didn t have money to get more?: No   Transportation Needs: Low Risk  (4/10/2025)    Transportation Needs     Within the past 12 months, has lack of transportation kept you from medical appointments, getting your medicines, non-medical meetings or appointments, work, or from getting things that you need?: No   Physical Activity: Unknown (4/10/2025)    Exercise Vital Sign     Days of Exercise per Week: 5 days     Minutes of Exercise per Session: Not on file   Stress: No Stress Concern Present (4/10/2025)    [employer] [place] [employer] - Occupational Stress Questionnaire     Feeling of Stress : Not at all   Social Connections: Unknown (4/10/2025)    Social Connection and Isolation Panel [NHANES]     Frequency of Communication with Friends and Family: Not on file     Frequency of Social Gatherings with Friends and Family: Never     Attends Synagogue Services: Not on file     Active Member of Clubs or Organizations: Not on file     Attends Club or Organization Meetings: Not on file     Marital Status: Not on file   Interpersonal Safety: Low Risk  (4/10/2025)    Interpersonal Safety     Do you feel physically and emotionally safe where you currently live?: Yes     Within the past 12 months, have you been hit, slapped, kicked or otherwise physically hurt by someone?: No     Within the past 12 months, have you  been humiliated or emotionally abused in other ways by your partner or ex-partner?: No   Housing Stability: Low Risk  (4/10/2025)    Housing Stability     Do you have housing? : Yes     Are you worried about losing your housing?: No       MEDICATIONS:  No current facility-administered medications for this encounter.     Current Outpatient Medications   Medication Sig Dispense Refill    oxyCODONE (ROXICODONE) 5 MG tablet Take 1 tablet (5 mg) by mouth every 6 hours as needed for severe pain. 12 tablet 0    docusate sodium (COLACE) 100 MG capsule Take 1 capsule (100 mg) by mouth daily (Patient not taking: Reported on 12/10/2024) 30 capsule 0    hydrocortisone 2.5 % ointment Apply topically 2 times daily. 60 g 1    ibuprofen (ADVIL/MOTRIN) 800 MG tablet Take 1 tablet (800 mg) by mouth every 6 hours as needed for other (cramping) 30 tablet 0    Prenatal Vit-Fe Fumarate-FA (PRENATAL MULTIVITAMIN W/IRON) 27-0.8 MG tablet Take 1 tablet by mouth daily (Patient not taking: Reported on 12/10/2024) 90 tablet 3       ALLERGIES:  No Known Allergies    REVIEW OF SYSTEMS    Negative except what is stated in the HPI    PHYSICAL EXAM:  BP 93/55 (BP Location: Left arm, Patient Position: Semi-Burns's)   Pulse 76   Temp 98.2  F (36.8  C) (Oral)   Resp 20   Wt 69.9 kg (154 lb)   SpO2 95%   BMI 29.84 kg/m     General Appearance: Alert, appropriate appearance for age. No acute distress,   HEENT : Grossly normal  Gastrointestinal: soft, non-tender  Pelvic Exam Female:  left labia majora and minora are erythematous and enlarged. She is tender on exam. Slightly fluctuant mass. No evidence of Bartholin's gland abscess. I&D was performed. Area was prepped. 2 mL of xylocaine was injected. A 11 blade was used to incise the abscess. Purulent material drained. Minimal bleeding after the procedure.   Musculoskeletal Exam: Back is non-tender, no cva tenderness, moving all four extremities  Skin: no rash or abnormalities,   Neurologic: Normal  gait and speech   Psychiatric: Alert and oriented, appropriate affect.    LABS  Lab Results   Component Value Date    HGB 13.9 06/13/2025       IMAGING:  PELVIC ORGANS: Peripherally enhancing 3.6 x 2.0 x 1.0 cm left Bartholin's gland cyst with mild thickening of the overlying dermis and mild edema in the adjacent the subcutaneous fat. Small amount of free physiologic fluid in the right pelvic cul-de-sac   adjacent to the benign 2 cm peripherally enhancing dominant right ovarian physiologic follicle. Well-positioned IUD.     MUSCULOSKELETAL: Unremarkable.                                                                      IMPRESSION:   1.  Peripherally enhancing 3.6 x 2.0 x 1.0 cm left Bartholin's gland cyst with mild thickening of the overlying dermis and mild edema in the adjacent the subcutaneous fat compatible with infection in the proper clinical setting.  2.  Incidental findings as detailed above.    Radiology Results: Personally reviewed impression/s    IMPRESSION:  25 year old here with recurrent left vulvar abscess.     RECOMMENDATIONS:  She was given IV pain medication. After verbal consent was obtained, I performed an I&D and expressed a large amount of non-malodorous pus. No active bleeding. Plan to send home on ibuprofen, tylenol, and oxycodone prn. Recommend sitz baths and warm compresses. Recommend oral antibiotics. Follow up in clinic on Monday as scheduled.     Thank you for allowing us to participate in the care of this patient.  Please contact us with any questions/concerns.    Carly Welhs MD     CC: Hermelinda Oliva,

## 2025-06-13 NOTE — Clinical Note
Gume King was seen and treated in our emergency department on 6/13/2025.  She may return to work on 06/14/2025.  Please excuse her absence on 06/12/25 as well.      If you have any questions or concerns, please don't hesitate to call.      Alycia Cosby PA-C

## 2025-06-13 NOTE — ED NOTES
Expected Patient Referral to ED  8:47 AM    Referring Clinic/Provider:  [facility]    Reason for referral/Clinical facts:  Vulva abscess  Perviously operated on for same  Now in clinic for same, pain uncontrolled  Rec IV pain meds, abx. Plus imaging to assess for drainable fluid collection  Dr. Cordon on call    Recommendations provided:  Send to ED for further evaluation    Caller was informed that this institution does possess the capabilities and/or resources to provide for patient and should be transferred to our facility.    Discussed that if direct admit is sought and any hurdles are encountered, this ED would be happy to see the patient and evaluate.    Informed caller that recommendations provided are recommendations based only on the facts provided and that they responsible to accept or reject the advice, or to seek a formal in person consultation as needed and that this ED will see/treat patient should they arrive.      Phillip Briseno MD  [facility] EMERGENCY DEPARTMENT  [address] [place] [id number]  [phone]       Phillip Briseno MD  06/13/25 0849

## 2025-06-13 NOTE — ED PROVIDER NOTES
EMERGENCY DEPARTMENT ENCOUNTER      NAME: Gume King  AGE: 25 year old female  YOB: 2000  MRN: [id number]  EVALUATION DATE & TIME: 6/13/2025  8:52 AM    PCP: Hermelinda Oliva    ED PROVIDER: Alycia Cosby PA-C      Chief Complaint   Patient presents with    Cyst         FINAL IMPRESSION:  1. Vulvar abscess          ED COURSE & MEDICAL DECISION MAKING:    Pertinent Labs & Imaging studies reviewed. (See chart for details)    25 year old female presents to the Emergency Department for evaluation of a labial cyst/abscess.     Physical exam is remarkable for an uncomfortable appearing female, she is tearful. Abdomen is soft and non-tender.  exam is remarkable for swelling of the left vulvar region with severe tenderness to even light palpation. No perineal tenderness to palpation. Vital signs are stable and she is afebrile.     CBC is remarkable for leukocytosis with white blood cell count of 13.8, no anemia noted and platelet count is noted to be normal.  BMP unremarkable with no significant electrolyte derangements, normal kidney function.  CRP is elevated at 26.5.  Lactic acid within normal limits.  A CT scan of the abdomen/pelvis does show a rim-enhancing fluid collection which they are reading is a Bartholin gland cyst.    The patient was given Toradol and Dilaudid for treatment of her pain.  I discussed her presentation and evaluation with on-call OB/GYN Dr. Cordon who saw the patient in the emergency department and drained the abscess at bedside.  She was previously prescribed antibiotics which I advised her to start taking immediately.  For pain control, will provide a short prescription of oxycodone.  She has follow-up scheduled in clinic on Monday which I encouraged her to keep, recommend return here for any new or worsening symptoms.  The patient is agreeable with this treatment plan and verbalized understanding.    Medical Decision Making  I reviewed the EMR: Outpatient Record: GYN notes  I  discussed the care with another health care provider: GYN Dr. Cordon  Discharge. I prescribed additional prescription strength medication(s) as charted. I considered admission, but discharged patient after significant clinical improvement.    MIPS (CTPE, Dental pain, Devi, Sinusitis, Asthma/COPD, Head Trauma): Not Applicable    SEPSIS: None        ED Course   8:55 AM Performed my initial history and physical exam. Discussed workup in the emergency department, management of symptoms, and likely disposition.   11:00 AM Discussed with Dr. Cordon with GYN. She will evaluate the patient.   11:50 AM GYN drained abscess at bedside.   12:15 PM I discussed the plan for discharge with the patient or family and they are agreeable.. We discussed supportive cares at home and reasons for return to the ER including new or worsening symptoms - all questions and concerns addressed. Patient to be discharged by RN.    At the conclusion of the encounter I discussed the results of all of the tests and the disposition. The questions were answered. The patient or family acknowledged understanding and was agreeable with the care plan.     Voice recognition software was used in the creation of this note. Any grammatical or nonsensical errors are due to inherent errors with the software and are not the intention of the writer.     MEDICATIONS GIVEN IN THE EMERGENCY:  Medications   HYDROmorphone (PF) (DILAUDID) injection 0.5 mg (0.5 mg Intravenous $Given 6/13/25 0923)   iopamidol (ISOVUE-370) solution 76 mL (76 mLs Intravenous $Given 6/13/25 1038)   HYDROmorphone (PF) (DILAUDID) injection 0.5 mg (0.5 mg Intravenous $Given 6/13/25 1127)   ketorolac (TORADOL) injection 15 mg (15 mg Intravenous $Given 6/13/25 1311)       NEW PRESCRIPTIONS STARTED AT TODAY'S ER VISIT  New Prescriptions    OXYCODONE (ROXICODONE) 5 MG TABLET    Take 1 tablet (5 mg) by mouth every 6 hours as needed for severe pain.             =================================================================    HPI    Patient information was obtained from: Patient     Use of : iPad - Kimberli King is a 25 year old female who presents to the ED via walk-in for evaluation of a labial fluid collection.     The patient presents today for evaluation of a labial cyst on the left labia minora. She has a history of similar cysts and it became acutely inflamed a few days ago. She states it is very painful, she has tried tylenol without improvement in her pain. She was seen in GYN clinic today and sent here for pain control and further evaluation.     She denies fevers, chills, abdominal pain, abnormal vaginal bleeding or discharge. LMP was 05/15/25.      REVIEW OF SYSTEMS   Review of Systems   Constitutional:  Negative for chills and fever.   Gastrointestinal:  Negative for abdominal pain, nausea and vomiting.   Genitourinary:  Positive for vaginal pain. Negative for dysuria, hematuria, vaginal bleeding and vaginal discharge.       All other systems reviewed and are negative unless noted in HPI.      PAST MEDICAL HISTORY:  Past Medical History:   Diagnosis Date    Varicella        PAST SURGICAL HISTORY:  Past Surgical History:   Procedure Laterality Date    COLONOSCOPY      EXCISE MASS VULVA Left 3/3/2025    Procedure: EXCISION OF LEFT LABIAL ABSCESS;  Surgeon: Alana Quick MD;  Location: [facility]       CURRENT MEDICATIONS:    oxyCODONE (ROXICODONE) 5 MG tablet  docusate sodium (COLACE) 100 MG capsule  hydrocortisone 2.5 % ointment  ibuprofen (ADVIL/MOTRIN) 800 MG tablet  Prenatal Vit-Fe Fumarate-FA (PRENATAL MULTIVITAMIN W/IRON) 27-0.8 MG tablet        ALLERGIES:  No Known Allergies    FAMILY HISTORY:  Family History   Problem Relation Age of Onset    Hypertension Mother     Diabetes Mother     Diabetes Maternal Grandmother     No Known Problems Brother     No Known Problems Brother     No Known Problems Brother      Stillborn Son        SOCIAL HISTORY:   Social History     Socioeconomic History    Marital status: Single   Tobacco Use    Smoking status: Never     Passive exposure: Current (father smokes outside)    Smokeless tobacco: Never   Vaping Use    Vaping status: Never Used   Substance and Sexual Activity    Alcohol use: Never    Drug use: Never    Sexual activity: Yes     Partners: Male     Birth control/protection: None   Social History Narrative    9/8/18: Presents to the ED with mother and father.      Social Drivers of Health     Financial Resource Strain: Low Risk  (4/10/2025)    Financial Resource Strain     Within the past 12 months, have you or your family members you live with been unable to get utilities (heat, electricity) when it was really needed?: No   Food Insecurity: Low Risk  (4/10/2025)    Food Insecurity     Within the past 12 months, did you worry that your food would run out before you got money to buy more?: No     Within the past 12 months, did the food you bought just not last and you didn t have money to get more?: No   Transportation Needs: Low Risk  (4/10/2025)    Transportation Needs     Within the past 12 months, has lack of transportation kept you from medical appointments, getting your medicines, non-medical meetings or appointments, work, or from getting things that you need?: No   Physical Activity: Unknown (4/10/2025)    Exercise Vital Sign     Days of Exercise per Week: 5 days   Stress: No Stress Concern Present (4/10/2025)    [employer] [place] [employer] - Occupational Stress Questionnaire     Feeling of Stress : Not at all   Social Connections: Unknown (4/10/2025)    Social Connection and Isolation Panel [NHANES]     Frequency of Social Gatherings with Friends and Family: Never   Interpersonal Safety: Low Risk  (4/10/2025)    Interpersonal Safety     Do you feel physically and emotionally safe where you currently live?: Yes     Within the past 12 months, have you been hit,  slapped, kicked or otherwise physically hurt by someone?: No     Within the past 12 months, have you been humiliated or emotionally abused in other ways by your partner or ex-partner?: No   Housing Stability: Low Risk  (4/10/2025)    Housing Stability     Do you have housing? : Yes     Are you worried about losing your housing?: No       VITALS:  Patient Vitals for the past 24 hrs:   BP Temp Temp src Pulse Resp SpO2 Weight   06/13/25 1300 93/55 98.2  F (36.8  C) Oral 76 20 95 % --   06/13/25 1215 129/79 -- -- 88 -- 98 % --   06/13/25 1121 108/73 98.2  F (36.8  C) Oral 80 -- 98 % --   06/13/25 0857 122/89 -- -- 85 -- 93 % --   06/13/25 0851 (!) 132/90 97.9  F (36.6  C) Oral 95 18 97 % 69.9 kg (154 lb)       PHYSICAL EXAM    VITAL SIGNS: BP 93/55 (BP Location: Left arm, Patient Position: Semi-Burns's)   Pulse 76   Temp 98.2  F (36.8  C) (Oral)   Resp 20   Wt 69.9 kg (154 lb)   SpO2 95%   BMI 29.84 kg/m    General Appearance: Uncomfortable appearing; Alert, cooperative, normal speech and facial symmetry, appears stated age  Head:  Normocephalic, without obvious abnormality, atraumatic  Abdomen:  Abdomen is soft, non-distended with no tenderness to palpation, rebound tenderness, or guarding.   : Swelling of the left vulva with severe tenderness to even light palpation. No perineal tenderness to palpation  Extremities: Moves all extremities  Neuro: Patient is awake, alert, and responsive to voice. No gross motor weaknesses or sensory loss; moves all extremities.    LAB:  All pertinent labs reviewed and interpreted.  Labs Ordered and Resulted from Time of ED Arrival to Time of ED Departure   BASIC METABOLIC PANEL - Abnormal       Result Value    Sodium 135      Potassium 3.9      Chloride 101      Carbon Dioxide (CO2) 20 (*)     Anion Gap 14      Urea Nitrogen 9.4      Creatinine 0.54      GFR Estimate >90      Calcium 9.2      Glucose 83     CRP INFLAMMATION - Abnormal    CRP Inflammation 26.50 (*)    CBC WITH  PLATELETS AND DIFFERENTIAL - Abnormal    WBC Count 13.8 (*)     RBC Count 5.66 (*)     Hemoglobin 13.9      Hematocrit 43.7      MCV 77 (*)     MCH 24.6 (*)     MCHC 31.8      RDW 14.6      Platelet Count 344      % Neutrophils 84      % Lymphocytes 11      % Monocytes 5      % Eosinophils 0      % Basophils 0      % Immature Granulocytes 0      NRBCs per 100 WBC 0      Absolute Neutrophils 11.6 (*)     Absolute Lymphocytes 1.5      Absolute Monocytes 0.7      Absolute Eosinophils 0.0      Absolute Basophils 0.0      Absolute Immature Granulocytes 0.1      Absolute NRBCs 0.0     LACTIC ACID WHOLE BLOOD WITH 1X REPEAT IN 2 HR WHEN >2 - Normal    Lactic Acid, Initial 0.8     HCG QUALITATIVE PREGNANCY - Normal    hCG Serum Qualitative Negative         RADIOLOGY:  Reviewed all pertinent imaging. Please see official radiology report.  CT Abdomen Pelvis w Contrast   Final Result   IMPRESSION:    1.  Peripherally enhancing 3.6 x 2.0 x 1.0 cm left Bartholin's gland cyst with mild thickening of the overlying dermis and mild edema in the adjacent the subcutaneous fat compatible with infection in the proper clinical setting.   2.  Incidental findings as detailed above.              Alycia Cosby PA-C  Emergency Medicine  [facility] EMERGENCY DEPARTMENT  [facility] [facility] [id number] [phone]  Dept: [phone]       Alycia Cosby PA-C  06/13/25 3022

## 2025-08-06 ENCOUNTER — MYC REFILL (OUTPATIENT)
Dept: FAMILY MEDICINE | Facility: CLINIC | Age: 25
End: 2025-08-06
Payer: COMMERCIAL

## 2025-08-06 DIAGNOSIS — K64.4 EXTERNAL HEMORRHOIDS: ICD-10-CM

## 2025-08-07 RX ORDER — HYDROCORTISONE 25 MG/G
OINTMENT TOPICAL 2 TIMES DAILY
Qty: 60 G | Refills: 3 | Status: SHIPPED | OUTPATIENT
Start: 2025-08-07